# Patient Record
Sex: FEMALE | Race: WHITE | NOT HISPANIC OR LATINO | URBAN - METROPOLITAN AREA
[De-identification: names, ages, dates, MRNs, and addresses within clinical notes are randomized per-mention and may not be internally consistent; named-entity substitution may affect disease eponyms.]

---
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
## 2021-01-12 ENCOUNTER — E-VISIT (OUTPATIENT)
Dept: FAMILY MEDICINE CLINIC | Facility: CLINIC | Age: 21
End: 2021-01-12

## 2021-01-12 DIAGNOSIS — B37.49 GENITOURINARY INFECTION, CANDIDAL: Primary | ICD-10-CM

## 2021-01-12 PROCEDURE — 99213 OFFICE O/P EST LOW 20 MIN: CPT | Performed by: FAMILY MEDICINE

## 2021-01-12 RX ORDER — FLUCONAZOLE 100 MG/1
100 TABLET ORAL DAILY
Qty: 5 TABLET | Refills: 0 | Status: SHIPPED | OUTPATIENT
Start: 2021-01-12 | End: 2021-05-03 | Stop reason: SDUPTHER

## 2021-01-13 NOTE — PROGRESS NOTES
Chief Complaint  Vaginal discharge  Subjective          Mouna Manley presents to [facility] for vaginal discharge.  History of Present Illness  E-visit for vaginal discharge.  Symptoms noted few days ago with a thick whitish discharge.  She denies pelvic pain, fever, abdominal pain and the urinary symptoms.  Patient has spontaneous miscarriage 3 weeks ago was seen in the ER and followed by OB/GYN.  Objective   Vital Signs:   There were no vitals taken for this visit.    Physical Exam   E-Visit  Result Review :     CMP    CMP 10/13/20 12/16/20   BUN 11 7   Creatinine 0.56 (A) 0.52 (A)   eGFR Non African Am 139 >150   Sodium 138 137   Potassium 4.1 3.9   Chloride 104 104   Calcium 9.9 9.4   Albumin  4.60   Total Bilirubin  0.3   Alkaline Phosphatase  55   AST (SGOT)  13   ALT (SGPT)  11   (A) Abnormal value            CBC    CBC 10/13/20 12/16/20   WBC 5.86 6.10   RBC 4.39 3.93   Hemoglobin 12.5 11.3 (A)   Hematocrit 37.6 33.5 (A)   MCV 85.6 85.3   MCH 28.5 28.7   MCHC 33.2 33.6   RDW 13.3 13.8   Platelets 284 269   (A) Abnormal value            Data reviewed: Radiologic studies pelvic US          Assessment and Plan    Problem List Items Addressed This Visit        Infectious Diseases    Genitourinary infection, candidal - Primary    Relevant Medications    fluconazole (Diflucan) 100 MG tablet        Follow Up   Return in about 4 weeks (around 2/9/2021) for Recheck.  Patient was given instructions and counseling regarding her condition or for health maintenance advice. Please see specific information pulled into the AVS if appropriate.

## 2021-04-30 ENCOUNTER — E-VISIT (OUTPATIENT)
Dept: FAMILY MEDICINE CLINIC | Facility: CLINIC | Age: 21
End: 2021-04-30

## 2021-04-30 ENCOUNTER — TELEPHONE (OUTPATIENT)
Dept: FAMILY MEDICINE CLINIC | Facility: CLINIC | Age: 21
End: 2021-04-30

## 2021-04-30 DIAGNOSIS — N89.8 VAGINAL DISCHARGE: ICD-10-CM

## 2021-04-30 DIAGNOSIS — N30.00 ACUTE CYSTITIS WITHOUT HEMATURIA: Primary | ICD-10-CM

## 2021-04-30 PROCEDURE — 99422 OL DIG E/M SVC 11-20 MIN: CPT | Performed by: FAMILY MEDICINE

## 2021-04-30 NOTE — TELEPHONE ENCOUNTER
Caller: Mouna Manley    Relationship: Self    Best call back number: [phone]    Who are you requesting to speak with (clinical staff, provider,  specific staff member):CLINICAL STAFF    What was the call regarding: PATIENT WENT TO [facility] ER LAST NIGHT AND THEY RAN A BUNCH OF LAB WORK ON HER. THEY INFORMED PATIENT TODAY THAT THEY COULD NOT RELEASE HER LAB WORK TO HER OVER THE PHONE THAT HER PCP WOULD HAVE TO REQUEST THE RECORDS THEN GO OVER THE LABS WITH HER. CAN THE OFFICE GET THOSE LABS AND INFORM PATIENT OF RESULTS PLEASE.     Do you require a callback: YES          
Called [facility] they are to fax over records.  
artificial rupture

## 2021-05-03 PROBLEM — N30.00 ACUTE CYSTITIS WITHOUT HEMATURIA: Status: ACTIVE | Noted: 2021-05-03

## 2021-05-03 PROBLEM — N89.8 VAGINAL DISCHARGE: Status: ACTIVE | Noted: 2021-05-03

## 2021-05-03 RX ORDER — CIPROFLOXACIN 500 MG/1
500 TABLET, FILM COATED ORAL 2 TIMES DAILY
Qty: 14 TABLET | Refills: 0 | Status: SHIPPED | OUTPATIENT
Start: 2021-05-03 | End: 2021-05-10

## 2021-05-03 RX ORDER — FLUCONAZOLE 150 MG/1
150 TABLET ORAL ONCE
Qty: 1 TABLET | Refills: 0 | Status: SHIPPED | OUTPATIENT
Start: 2021-05-03 | End: 2021-05-03

## 2021-05-04 NOTE — PROGRESS NOTES
Chief Complaint  Follow up from [facility] ER on Vaginal discharge    Subjective    ROS  + vaginal discharge and pelvic pain,   no fever, dysuria, abdominal pain, nausea and vomiting.        Mouna Manley presents to [facility]  History of Present Illness  Patient seen at [facility] ER on 4/30/21 with vaginal discharge and back pain. Per patient she was told to have UTI but was not send home on any antibiotic.   Objective   Vital Signs:   There were no vitals taken for this visit.    Physical Exam   Result Review :     CBC    CBC 10/13/20 12/16/20   WBC 5.86 6.10   RBC 4.39 3.93   Hemoglobin 12.5 11.3 (A)   Hematocrit 37.6 33.5 (A)   MCV 85.6 85.3   MCH 28.5 28.7   MCHC 33.2 33.6   RDW 13.3 13.8   Platelets 284 269   (A) Abnormal value            UA    Urinalysis 12/16/20 12/16/20    1335 1335   Squamous Epithelial Cells, UA  0-2   Specific Phyllis, UA 1.011    Ketones, UA Negative    Blood, UA Large (3+) (A)    Leukocytes, UA Negative    Nitrite, UA Negative    RBC, UA  6-12 (A)   WBC, UA  0-2 (A)   Bacteria, UA  None Seen   (A) Abnormal value                      Assessment and Plan    Diagnoses and all orders for this visit:    1. Acute cystitis without hematuria (Primary)  Assessment & Plan:  Hospital records reviewed - chlamydia and  gonorrhea negative.  U/A - showed abnormalities, pelvic US showed ovarian cyst.  Rx Cipro and Diflucan take as directed.  F/U with OB/GYN.      2. Vaginal discharge    Other orders  -     fluconazole (Diflucan) 150 MG tablet; Take 1 tablet by mouth 1 (One) Time for 1 dose.  Dispense: 1 tablet; Refill: 0  -     ciprofloxacin (CIPRO) 500 MG tablet; Take 1 tablet by mouth 2 (Two) Times a Day for 7 days.  Dispense: 14 tablet; Refill: 0      Follow Up   Return in about 4 weeks (around 5/28/2021) for Recheck.  Patient was given instructions and counseling regarding her condition or for health maintenance advice. Please see specific information pulled into the AVS if  appropriate.

## 2021-05-04 NOTE — ASSESSMENT & PLAN NOTE
Hospital records reviewed - chlamydia and  gonorrhea negative.  U/A - showed abnormalities, pelvic US showed ovarian cyst.  Rx Cipro and Diflucan take as directed.  F/U with OB/GYN.

## 2021-07-25 ENCOUNTER — E-VISIT (OUTPATIENT)
Dept: FAMILY MEDICINE CLINIC | Facility: CLINIC | Age: 21
End: 2021-07-25

## 2021-07-25 DIAGNOSIS — N30.01 ACUTE CYSTITIS WITH HEMATURIA: Primary | ICD-10-CM

## 2021-07-25 PROCEDURE — 99212 OFFICE O/P EST SF 10 MIN: CPT | Performed by: FAMILY MEDICINE

## 2021-07-25 RX ORDER — PHENAZOPYRIDINE HYDROCHLORIDE 200 MG/1
200 TABLET, FILM COATED ORAL 3 TIMES DAILY
Qty: 6 TABLET | Refills: 0 | Status: SHIPPED | OUTPATIENT
Start: 2021-07-25 | End: 2021-07-27

## 2021-07-25 RX ORDER — CIPROFLOXACIN 500 MG/1
500 TABLET, FILM COATED ORAL 2 TIMES DAILY
Qty: 14 TABLET | Refills: 0 | Status: SHIPPED | OUTPATIENT
Start: 2021-07-25 | End: 2021-08-01

## 2021-07-25 NOTE — PROGRESS NOTES
Subjective   Mouna Manley is a 21 y.o. female.     History of Present Illness   E-visit for dysuria.  Patient complaining of pain with urination, frequent urination and the blood in urine.    The following portions of the patient's history were reviewed and updated as appropriate: past family history, past medical history, past social history, past surgical history and problem list.    Review of Systems  Positive for dysuria, urinary frequency, back pain and hematuria.  No fever, flank pain, nausea and vomiting    Objective   Physical Exam  E-visit.    Assessment/Plan   Problems Addressed this Visit        Genitourinary and Reproductive     Acute cystitis with hematuria - Primary     Encourage plenty of fluids Rx sent for Cipro and pyridium.  Please make appointment if symptoms does not get  better.         Relevant Medications    phenazopyridine (Pyridium) 200 MG tablet      Diagnoses       Codes Comments    Acute cystitis with hematuria    -  Primary ICD-10-CM: N30.01  ICD-9-CM: 595.0

## 2021-07-25 NOTE — ASSESSMENT & PLAN NOTE
Encourage plenty of fluids Rx sent for Cipro and pyridium.  Please make appointment if symptoms does not get  better.

## 2021-09-03 ENCOUNTER — E-VISIT (OUTPATIENT)
Dept: FAMILY MEDICINE CLINIC | Facility: CLINIC | Age: 21
End: 2021-09-03

## 2021-09-03 ENCOUNTER — E-VISIT (OUTPATIENT)
Dept: FAMILY MEDICINE CLINIC | Facility: TELEHEALTH | Age: 21
End: 2021-09-03

## 2021-09-03 DIAGNOSIS — R39.89 SUSPECTED UTI: Primary | ICD-10-CM

## 2021-09-03 DIAGNOSIS — N39.0 UTI (URINARY TRACT INFECTION), UNCOMPLICATED: Primary | ICD-10-CM

## 2021-09-03 PROCEDURE — 99213 OFFICE O/P EST LOW 20 MIN: CPT | Performed by: FAMILY MEDICINE

## 2021-09-03 PROCEDURE — 99422 OL DIG E/M SVC 11-20 MIN: CPT | Performed by: NURSE PRACTITIONER

## 2021-09-03 RX ORDER — PHENAZOPYRIDINE HYDROCHLORIDE 200 MG/1
200 TABLET, FILM COATED ORAL 3 TIMES DAILY
Qty: 6 TABLET | Refills: 0 | Status: SHIPPED | OUTPATIENT
Start: 2021-09-03 | End: 2021-09-05

## 2021-09-03 RX ORDER — PHENAZOPYRIDINE HYDROCHLORIDE 200 MG/1
200 TABLET, FILM COATED ORAL 3 TIMES DAILY PRN
Qty: 6 TABLET | Refills: 0 | Status: SHIPPED | OUTPATIENT
Start: 2021-09-03 | End: 2021-09-03 | Stop reason: SDUPTHER

## 2021-09-03 RX ORDER — NITROFURANTOIN 25; 75 MG/1; MG/1
100 CAPSULE ORAL 2 TIMES DAILY
Qty: 14 CAPSULE | Refills: 0 | Status: SHIPPED | OUTPATIENT
Start: 2021-09-03 | End: 2021-09-10

## 2021-09-03 RX ORDER — CIPROFLOXACIN 500 MG/1
500 TABLET, FILM COATED ORAL 2 TIMES DAILY
Qty: 14 TABLET | Refills: 0 | Status: SHIPPED | OUTPATIENT
Start: 2021-09-03 | End: 2021-09-10

## 2021-09-03 NOTE — PROGRESS NOTES
Mouna Manley    2000  [id number]    I have reviewed the e-Visit questionnaire and patient's answers, my assessment and plan are as follows:      [facility]  Mouna Manley is a 21 y.o. with a 1-4 day history of dysuria described as sharp pain, difficulty passing urine, cloudy yellow urine, odor to urine, back pain.  She denies fever/chills, nausea/vomiting, belly pain, hematuria, or vaginal symptoms.  She reports having these symptoms often which usually resolve with antibiotic treatment for UTI.     Review of Systems - Negative except symptoms listed in [facility]      Diagnoses and all orders for this visit:    1. Suspected UTI (Primary)  -     nitrofurantoin, macrocrystal-monohydrate, (MACROBID) 100 MG capsule; Take 1 capsule by mouth 2 (Two) Times a Day for 7 days.  Dispense: 14 capsule; Refill: 0  -     phenazopyridine (PYRIDIUM) 200 MG tablet; Take 1 tablet by mouth 3 (Three) Times a Day As Needed for Bladder Spasms for up to 2 days.  Dispense: 6 tablet; Refill: 0    -Macrobid as prescribed - complete entire course of medication even if you begin to feel better.   --Phenazopyridine is for painful urination and bladder spasms--this medication with cause urine to become bright orange and can stain undergarments.    -Continue to increase your fluid intake.   -Abstain from intercourse during antibiotic treatment.   -Practice good perineal hygiene: wipe front to back  -Do not hold your urine- go to the bathroom every 2-3 hours.     -Warning signs: severe abdominal/pelvic/back pain, fever >101, blood in urine - seek medical attention as soon as possible for a hands on/objective exam and possible labs.     -Follow up with your PCP in 2 days if no improvement in symptoms or if symptoms begin to worsen.       Any medications prescribed have been sent electronically to   [facility]/pharmacy #[id number] - [place], [place] - [address] - [phone]  - [phone]  [address] [place] [address]  Phone: [phone] Fax:  [phone]    [facility]/pharm[facility][id number] [place] [place] [place] [address] - [phone]  - [phone] FX  [address] [place] [address]  Phone: [phone] Fax: [phone]      Time Documentation  Counseled patient  Counseling topics: diagnosis, treatment options and return instructions  Total encounter time: counseling time more than 50% of visit: 20 minutes        ROSA ELENA Mcdaniel  09/03/21  07:50 EDT

## 2021-09-03 NOTE — PROGRESS NOTES
Subjective   Mouna Manley is a 21 y.o. female.     History of Present Illness     E-visit questionnaire reviewed present with complaint of urinary frequency and urgency. This is a new problem. The current episode started in the past 7 days. There has been no fever. Associated symptoms include frequency, hesitancy,  and urgency. Pertinent negatives include no discharge, flank pain, hematuria, nausea or vomiting.         The following portions of the patient's history were reviewed and updated as appropriate: past medical history, past social history, past surgical history and problem list.    Review of Systems  Positive for difficulty urinating, dysuria, burning, back pain but denies fever, abdominal pain, nausea and vomiting  Objective   Physical Exam  E-visit    Assessment/Plan   Problems Addressed this Visit        Genitourinary and Reproductive     UTI (urinary tract infection), uncomplicated - Primary     Rx Cipro and Pyridium take as directed.  Encourage fluids.         Relevant Medications    ciprofloxacin (CIPRO) 500 MG tablet      Diagnoses       Codes Comments    UTI (urinary tract infection), uncomplicated    -  Primary ICD-10-CM: N39.0  ICD-9-CM: 599.0

## 2021-09-03 NOTE — PATIENT INSTRUCTIONS
Urinary Tract Infection, Adult    A urinary tract infection (UTI) is an infection of any part of the urinary tract. The urinary tract includes the kidneys, ureters, bladder, and urethra. These organs make, store, and get rid of urine in the body.  Your health care provider may use other names to describe the infection. An upper UTI affects the ureters and kidneys (pyelonephritis). A lower UTI affects the bladder (cystitis) and urethra (urethritis).  What are the causes?  Most urinary tract infections are caused by bacteria in your genital area, around the entrance to your urinary tract (urethra). These bacteria grow and cause inflammation of your urinary tract.  What increases the risk?  You are more likely to develop this condition if:  · You have a urinary catheter that stays in place (indwelling).  · You are not able to control when you urinate or have a bowel movement (you have incontinence).  · You are female and you:  ? Use a spermicide or diaphragm for birth control.  ? Have low estrogen levels.  ? Are pregnant.  · You have certain genes that increase your risk (genetics).  · You are sexually active.  · You take antibiotic medicines.  · You have a condition that causes your flow of urine to slow down, such as:  ? An enlarged prostate, if you are male.  ? Blockage in your urethra (stricture).  ? A kidney stone.  ? A nerve condition that affects your bladder control (neurogenic bladder).  ? Not getting enough to drink, or not urinating often.  · You have certain medical conditions, such as:  ? Diabetes.  ? A weak disease-fighting system (immunesystem).  ? Sickle cell disease.  ? Gout.  ? Spinal cord injury.  What are the signs or symptoms?  Symptoms of this condition include:  · Needing to urinate right away (urgently).  · Frequent urination or passing small amounts of urine frequently.  · Pain or burning with urination.  · Blood in the urine.  · Urine that smells bad or unusual.  · Trouble urinating.  · Cloudy  urine.  · Vaginal discharge, if you are female.  · Pain in the abdomen or the lower back.  You may also have:  · Vomiting or a decreased appetite.  · Confusion.  · Irritability or tiredness.  · A fever.  · Diarrhea.  The first symptom in older adults may be confusion. In some cases, they may not have any symptoms until the infection has worsened.  How is this diagnosed?  This condition is diagnosed based on your medical history and a physical exam. You may also have other tests, including:  · Urine tests.  · Blood tests.  · Tests for sexually transmitted infections (STIs).  If you have had more than one UTI, a cystoscopy or imaging studies may be done to determine the cause of the infections.  How is this treated?  Treatment for this condition includes:  · Antibiotic medicine.  · Over-the-counter medicines to treat discomfort.  · Drinking enough water to stay hydrated.  If you have frequent infections or have other conditions such as a kidney stone, you may need to see a health care provider who specializes in the urinary tract (urologist).  In rare cases, urinary tract infections can cause sepsis. Sepsis is a life-threatening condition that occurs when the body responds to an infection. Sepsis is treated in the hospital with IV antibiotics, fluids, and other medicines.  Follow these instructions at home:    Medicines  · Take over-the-counter and prescription medicines only as told by your health care provider.  · If you were prescribed an antibiotic medicine, take it as told by your health care provider. Do not stop using the antibiotic even if you start to feel better.  General instructions  · Make sure you:  ? Empty your bladder often and completely. Do not hold urine for long periods of time.  ? Empty your bladder after sex.  ? Wipe from front to back after a bowel movement if you are female. Use each tissue one time when you wipe.  · Drink enough fluid to keep your urine pale yellow.  · Keep all follow-up  visits as told by your health care provider. This is important.  Contact a health care provider if:  · Your symptoms do not get better after 1-2 days.  · Your symptoms go away and then return.  Get help right away if you have:  · Severe pain in your back or your lower abdomen.  · A fever.  · Nausea or vomiting.  Summary  · A urinary tract infection (UTI) is an infection of any part of the urinary tract, which includes the kidneys, ureters, bladder, and urethra.  · Most urinary tract infections are caused by bacteria in your genital area, around the entrance to your urinary tract (urethra).  · Treatment for this condition often includes antibiotic medicines.  · If you were prescribed an antibiotic medicine, take it as told by your health care provider. Do not stop using the antibiotic even if you start to feel better.  · Keep all follow-up visits as told by your health care provider. This is important.  This information is not intended to replace advice given to you by your health care provider. Make sure you discuss any questions you have with your health care provider.  Document Revised: 12/05/2019 Document Reviewed: 06/27/2019  "[employer] Patient Education © 2021 "[employer] Inc.

## 2021-10-15 ENCOUNTER — TELEMEDICINE (OUTPATIENT)
Dept: FAMILY MEDICINE CLINIC | Facility: CLINIC | Age: 21
End: 2021-10-15

## 2021-10-15 DIAGNOSIS — R23.3 EASY BRUISING: Primary | ICD-10-CM

## 2021-10-15 DIAGNOSIS — R53.83 FATIGUE, UNSPECIFIED TYPE: ICD-10-CM

## 2021-10-15 DIAGNOSIS — J06.9 UPPER RESPIRATORY TRACT INFECTION DUE TO COVID-19 VIRUS: ICD-10-CM

## 2021-10-15 DIAGNOSIS — U07.1 UPPER RESPIRATORY TRACT INFECTION DUE TO COVID-19 VIRUS: ICD-10-CM

## 2021-10-15 PROCEDURE — 99213 OFFICE O/P EST LOW 20 MIN: CPT | Performed by: FAMILY MEDICINE

## 2021-10-15 NOTE — PROGRESS NOTES
Subjective   Mouna Manley is a 21 y.o. female.     You have chosen to receive care through a telehealth visit.  Do you consent to use a video/audio connection for your medical care today? Yes.    Telehealth visit in view of COVID-19 pandemic. She present with C/O fatigue and easy bruising.  She also C/O cough and congestion and  tested positive for COVID-19.  URI   This is a new problem. The current episode started in the past 7 days. The problem has been gradually improving. There has been no fever. Associated symptoms include congestion and coughing. Pertinent negatives include no abdominal pain, ear pain, sinus pain, sore throat or wheezing. She has tried increased fluids for the symptoms.        The following portions of the patient's history were reviewed and updated as appropriate: past medical history, past social history, past surgical history and problem list.    Review of Systems   Constitutional: Negative for fever.   HENT: Positive for congestion. Negative for ear pain, sinus pressure and sore throat.    Respiratory: Positive for cough. Negative for shortness of breath and wheezing.    Gastrointestinal: Negative for abdominal pain.   Hematological: Negative for adenopathy. Bruises/bleeds easily.       Objective   Physical Exam  Pulmonary:      Effort: Pulmonary effort is normal.   Neurological:      Mental Status: She is alert.       Current Outpatient Medications on File Prior to Visit   Medication Sig Dispense Refill   • dicyclomine (BENTYL) 20 MG tablet TAKE 1 TABLET BY MOUTH TWICE A DAY 60 tablet 1     No current facility-administered medications on file prior to visit.           Assessment/Plan   Problems Addressed this Visit        Skin    Easy bruising - Primary    Relevant Orders    CBC w AUTO Differential    Basic metabolic panel       Symptoms and Signs    Fatigue    Relevant Orders    CBC w AUTO Differential    Basic metabolic panel       Other    Upper respiratory tract infection due to  COVID-19 virus     Discussed Sx management, fluids and rest.    This is a video visit [employer]. Was  used to complete this visit. Total time of discussion was 20 minutes.           Diagnoses       Codes Comments    Easy bruising    -  Primary ICD-10-CM: R23.8  ICD-9-CM: 782.9     Fatigue, unspecified type     ICD-10-CM: R53.83  ICD-9-CM: 780.79     Upper respiratory tract infection due to COVID-19 virus     ICD-10-CM: U07.1, J06.9  ICD-9-CM: 465.9, 079.89

## 2021-10-24 NOTE — ASSESSMENT & PLAN NOTE
Discussed Sx management, fluids and rest.    This is a video visit [employer] danielle. Was  used to complete this visit. Total time of discussion was 20 minutes.

## 2021-11-30 ENCOUNTER — HOSPITAL ENCOUNTER (EMERGENCY)
Facility: HOSPITAL | Age: 21
Discharge: HOME | End: 2021-11-30
Admitting: EMERGENCY MEDICINE

## 2021-11-30 ENCOUNTER — APPOINTMENT (OUTPATIENT)
Dept: ULTRASOUND IMAGING | Facility: HOSPITAL | Age: 21
End: 2021-11-30

## 2021-11-30 VITALS
SYSTOLIC BLOOD PRESSURE: 107 MMHG | BODY MASS INDEX: 19.13 KG/M2 | WEIGHT: 121.91 LBS | TEMPERATURE: 98.7 F | HEART RATE: 89 BPM | HEIGHT: 67 IN | RESPIRATION RATE: 16 BRPM | DIASTOLIC BLOOD PRESSURE: 63 MMHG | OXYGEN SATURATION: 97 %

## 2021-11-30 DIAGNOSIS — Z34.90 INTRAUTERINE PREGNANCY: ICD-10-CM

## 2021-11-30 DIAGNOSIS — B37.31 VAGINAL CANDIDIASIS: Primary | ICD-10-CM

## 2021-11-30 DIAGNOSIS — R10.30 LOWER ABDOMINAL PAIN: ICD-10-CM

## 2021-11-30 LAB
BILIRUB UR QL STRIP: NEGATIVE
CLARITY UR: CLEAR
CLUE CELLS SPEC QL WET PREP: ABNORMAL
COLOR UR: YELLOW
GLUCOSE UR STRIP-MCNC: NEGATIVE MG/DL
HGB UR QL STRIP.AUTO: NEGATIVE
HYDATID CYST SPEC WET PREP: ABNORMAL
KETONES UR QL STRIP: NEGATIVE
LEUKOCYTE ESTERASE UR QL STRIP.AUTO: NEGATIVE
NITRITE UR QL STRIP: NEGATIVE
PH UR STRIP.AUTO: 5.5 [PH] (ref 5–8)
PROT UR QL STRIP: NEGATIVE
SP GR UR STRIP: 1.02 (ref 1–1.03)
T VAGINALIS SPEC QL WET PREP: ABNORMAL
UROBILINOGEN UR QL STRIP: NORMAL
WBC SPEC QL WET PREP: ABNORMAL
YEAST GENITAL QL WET PREP: ABNORMAL

## 2021-11-30 PROCEDURE — 99283 EMERGENCY DEPT VISIT LOW MDM: CPT

## 2021-11-30 PROCEDURE — 87086 URINE CULTURE/COLONY COUNT: CPT

## 2021-11-30 PROCEDURE — 81003 URINALYSIS AUTO W/O SCOPE: CPT

## 2021-11-30 PROCEDURE — 87210 SMEAR WET MOUNT SALINE/INK: CPT

## 2021-11-30 PROCEDURE — 76817 TRANSVAGINAL US OBSTETRIC: CPT

## 2021-11-30 PROCEDURE — 76801 OB US < 14 WKS SINGLE FETUS: CPT

## 2021-11-30 PROCEDURE — 93976 VASCULAR STUDY: CPT

## 2021-12-03 LAB — BACTERIA SPEC AEROBE CULT: NORMAL

## 2022-02-06 ENCOUNTER — APPOINTMENT (OUTPATIENT)
Dept: GENERAL RADIOLOGY | Facility: HOSPITAL | Age: 22
End: 2022-02-06

## 2022-02-06 ENCOUNTER — HOSPITAL ENCOUNTER (EMERGENCY)
Facility: HOSPITAL | Age: 22
Discharge: ANOTHER HOSPITAL | End: 2022-02-07
Attending: EMERGENCY MEDICINE | Admitting: EMERGENCY MEDICINE

## 2022-02-06 ENCOUNTER — APPOINTMENT (OUTPATIENT)
Dept: ULTRASOUND IMAGING | Facility: HOSPITAL | Age: 22
End: 2022-02-06

## 2022-02-06 DIAGNOSIS — R45.851 SUICIDAL IDEATION: ICD-10-CM
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
## 2022-02-06 DIAGNOSIS — Z3A.15 15 WEEKS GESTATION OF PREGNANCY: ICD-10-CM

## 2022-02-06 DIAGNOSIS — S71.112A LACERATION OF LEFT THIGH, INITIAL ENCOUNTER: Primary | ICD-10-CM

## 2022-02-06 LAB
AMPHET+METHAMPHET UR QL: NEGATIVE
BACTERIA UR QL AUTO: ABNORMAL /HPF
BARBITURATES UR QL SCN: NEGATIVE
BENZODIAZ UR QL SCN: NEGATIVE
BILIRUB UR QL STRIP: NEGATIVE
CANNABINOIDS SERPL QL: POSITIVE
CLARITY UR: CLEAR
COCAINE UR QL: NEGATIVE
COLOR UR: YELLOW
GLUCOSE UR STRIP-MCNC: NEGATIVE MG/DL
HGB UR QL STRIP.AUTO: NEGATIVE
HYALINE CASTS UR QL AUTO: ABNORMAL /LPF
KETONES UR QL STRIP: ABNORMAL
LEUKOCYTE ESTERASE UR QL STRIP.AUTO: ABNORMAL
METHADONE UR QL SCN: NEGATIVE
NITRITE UR QL STRIP: NEGATIVE
OPIATES UR QL: NEGATIVE
OXYCODONE UR QL SCN: NEGATIVE
PH UR STRIP.AUTO: 7.5 [PH] (ref 5–8)
PROT UR QL STRIP: NEGATIVE
RBC # UR STRIP: ABNORMAL /HPF
REF LAB TEST METHOD: ABNORMAL
SARS-COV-2 RNA PNL SPEC NAA+PROBE: NOT DETECTED
SP GR UR STRIP: 1.03 (ref 1–1.03)
SQUAMOUS #/AREA URNS HPF: ABNORMAL /HPF
UROBILINOGEN UR QL STRIP: ABNORMAL
WBC # UR STRIP: ABNORMAL /HPF

## 2022-02-06 PROCEDURE — 99284 EMERGENCY DEPT VISIT MOD MDM: CPT

## 2022-02-06 PROCEDURE — 80307 DRUG TEST PRSMV CHEM ANLYZR: CPT | Performed by: EMERGENCY MEDICINE

## 2022-02-06 PROCEDURE — 87635 SARS-COV-2 COVID-19 AMP PRB: CPT | Performed by: EMERGENCY MEDICINE

## 2022-02-06 PROCEDURE — 76805 OB US >/= 14 WKS SNGL FETUS: CPT

## 2022-02-06 PROCEDURE — 81001 URINALYSIS AUTO W/SCOPE: CPT | Performed by: EMERGENCY MEDICINE

## 2022-02-06 PROCEDURE — 73030 X-RAY EXAM OF SHOULDER: CPT

## 2022-02-07 VITALS
SYSTOLIC BLOOD PRESSURE: 107 MMHG | HEIGHT: 67 IN | DIASTOLIC BLOOD PRESSURE: 68 MMHG | WEIGHT: 128.2 LBS | BODY MASS INDEX: 20.12 KG/M2 | OXYGEN SATURATION: 100 % | TEMPERATURE: 97.7 F | RESPIRATION RATE: 20 BRPM | HEART RATE: 73 BPM

## 2022-02-07 RX ORDER — CEFDINIR 300 MG/1
300 CAPSULE ORAL 2 TIMES DAILY
Qty: 10 CAPSULE | Refills: 0 | Status: SHIPPED | OUTPATIENT
Start: 2022-02-07 | End: 2022-12-09

## 2022-02-07 RX ORDER — CEFDINIR 300 MG/1
300 CAPSULE ORAL ONCE
Status: COMPLETED | OUTPATIENT
Start: 2022-02-07 | End: 2022-02-07

## 2022-02-07 RX ADMIN — CEFDINIR 300 MG: 300 CAPSULE ORAL at 01:36

## 2022-02-07 NOTE — ED PROVIDER NOTES
Subjective   Chief complaint: Patient is a pleasant 21-year-old female.  Today she began cutting her thighs she has some family in social issues that have been causing her to have thoughts of suicide.  She states she does not feel safe going home today.  She states she feels that she may harm herself although she does not have a definitive plan.  She has had depression in the past.  She is currently 15 weeks pregnant.  She states she has had 2 prior pregnancies that all resulted in miscarriage.  She states she fell down the stairs going outside today.  She has pain in her shoulder and pain in her low back.  No pain in her abdomen.  No loss of fluid or vaginal bleeding.  He has no numbness or weakness.  No head injury.    Context: As above    Duration: 4 PM she began cutting herself.    Timing: She has had suicidal thoughts all day    Severity: Her symptoms are becoming significant more severe    Associated Symptoms: Negative steps no above.  Appropriate PPE was used.        PCP:  LMP: Pregnant          Review of Systems   Constitutional: Negative for fever.   HENT: Negative.    Respiratory: Negative.    Cardiovascular: Negative.    Gastrointestinal: Negative for abdominal pain.   Genitourinary: Negative.    Musculoskeletal: Positive for arthralgias and back pain.   Skin: Negative.    Neurological: Negative.    Psychiatric/Behavioral: Negative.        Past Medical History:   Diagnosis Date   • Asthma    • Depression    • Eating disorder    • Headache    • Miscarriage 2019       No Known Allergies    Past Surgical History:   Procedure Laterality Date   • DENTAL PROCEDURE         Family History   Problem Relation Age of Onset   • Diabetes Maternal Grandfather        Social History     Socioeconomic History   • Marital status: Legally    Tobacco Use   • Smoking status: Former Smoker     Types: Electronic Cigarette     Quit date: 2019     Years since quittin.6   • Smokeless tobacco: Never Used   •  Tobacco comment:  vaped in the past but no longer   Substance and Sexual Activity   • Alcohol use: No   • Drug use: No   • Sexual activity: Yes     Partners: Male     Birth control/protection: None           Objective   Physical Exam  Vitals and nursing note reviewed. Exam conducted with a chaperone present.   Constitutional:       Appearance: Normal appearance.   HENT:      Head: Normocephalic and atraumatic.   Eyes:      Extraocular Movements: Extraocular movements intact.      Pupils: Pupils are equal, round, and reactive to light.   Cardiovascular:      Rate and Rhythm: Normal rate and regular rhythm.      Pulses: Normal pulses.      Heart sounds: Normal heart sounds.   Pulmonary:      Effort: Pulmonary effort is normal.      Breath sounds: Normal breath sounds.   Abdominal:      Tenderness: There is no abdominal tenderness.   Musculoskeletal:        Arms:       Cervical back: Neck supple. No tenderness.        Legs:       Comments: Tenderness to palpate to sacrum.  No step-offs no deformities.    Right shoulder has full range of motion.  There is some tenderness with abduction.  Neurovascular intact distally.  Axillary sensation intact.   Skin:     Capillary Refill: Capillary refill takes less than 2 seconds.             Comments: Multiple superficial lacerations to both thighs.  Significant area of lacerations.  One is deep enough at 2.5 cm for laceration repair.   Neurological:      General: No focal deficit present.      Mental Status: She is alert and oriented to person, place, and time.   Psychiatric:         Attention and Perception: Attention normal.         Mood and Affect: Affect is flat.         Behavior: Behavior normal.         Thought Content: Thought content includes suicidal ideation.         Cognition and Memory: Cognition normal.         Laceration Repair    Date/Time: 2/7/2022 12:39 AM  Performed by: Varun Castro DO  Authorized by: Varun Castro DO     Consent:     Consent  obtained:  Verbal    Consent given by:  Patient    Risks discussed:  Infection, pain, poor cosmetic result, need for additional repair, tendon damage, retained foreign body, poor wound healing, nerve damage and vascular damage    Alternatives discussed:  No treatment and delayed treatment  Anesthesia (see MAR for exact dosages):     Anesthesia method:  Local infiltration    Local anesthetic:  Lidocaine 1% WITH epi  Laceration details:     Location:  Leg    Leg location:  L upper leg    Length (cm):  2.5  Repair type:     Repair type:  Simple  Pre-procedure details:     Preparation:  Patient was prepped and draped in usual sterile fashion  Exploration:     Hemostasis achieved with:  Direct pressure    Wound exploration: wound explored through full range of motion and entire depth of wound probed and visualized      Wound extent: areolar tissue violated      Contaminated: no    Treatment:     Area cleansed with:  Hibiclens    Amount of cleaning:  Standard    Irrigation solution:  Sterile saline    Irrigation method:  Pressure wash  Skin repair:     Repair method:  Sutures    Suture size:  5-0    Suture material:  Nylon    Suture technique:  Simple interrupted    Number of sutures:  3  Approximation:     Approximation:  Close  Post-procedure details:     Dressing:  Antibiotic ointment               ED Course      Results for orders placed or performed during the hospital encounter of 02/06/22   COVID-19,CEPHEID/SHANE,COR/ERMIAS/PAD/ARNAUD IN-HOUSE(OR EMERGENT/ADD-ON),NP SWAB IN TRANSPORT MEDIA 3-4 HR TAT, RT-PCR - Swab, Nasopharynx    Specimen: Nasopharynx; Swab   Result Value Ref Range    COVID19 Not Detected Not Detected - Ref. Range   Urine Drug Screen - Urine, Clean Catch    Specimen: Urine, Clean Catch   Result Value Ref Range    Amphet/Methamphet, Screen Negative Negative    Barbiturates Screen, Urine Negative Negative    Benzodiazepine Screen, Urine Negative Negative    Cocaine Screen, Urine Negative Negative    Opiate  Screen Negative Negative    THC, Screen, Urine Positive (A) Negative    Methadone Screen, Urine Negative Negative    Oxycodone Screen, Urine Negative Negative   Urinalysis With Microscopic If Indicated (No Culture) - Urine, Clean Catch    Specimen: Urine, Clean Catch   Result Value Ref Range    Color, UA Yellow Yellow, Straw    Appearance, UA Clear Clear    pH, UA 7.5 5.0 - 8.0    Specific Gravity, UA 1.026 1.005 - 1.030    Glucose, UA Negative Negative    Ketones, UA Trace (A) Negative    Bilirubin, UA Negative Negative    Blood, UA Negative Negative    Protein, UA Negative Negative    Leuk Esterase, UA Trace (A) Negative    Nitrite, UA Negative Negative    Urobilinogen, UA 1.0 E.U./dL 0.2 - 1.0 E.U./dL   Urinalysis, Microscopic Only - Urine, Clean Catch    Specimen: Urine, Clean Catch   Result Value Ref Range    RBC, UA 0-2 (A) None Seen /HPF    WBC, UA 6-12 (A) None Seen /HPF    Bacteria, UA Trace (A) None Seen /HPF    Squamous Epithelial Cells, UA 3-6 (A) None Seen, 0-2 /HPF    Hyaline Casts, UA None Seen None Seen /LPF    Methodology Automated Microscopy            XR Shoulder 2+ View Right    Result Date: 2/6/2022  No fracture. Electronically signed by:  Walter Desai M.D.  2/6/2022 7:43 PM                                            MDM  Number of Diagnoses or Management Options  15 weeks gestation of pregnancy  Fall, initial encounter  Laceration of left thigh, initial encounter  Suicidal ideation  Diagnosis management comments: Patient has good range of motion of her shoulders neurovascular intact distally.  She declined x-ray of her sacral area.  She had no lumbar pain.  She is neuro logically intact.  Ambulating here fine in the emergency department.  Patient did present with multiple lacerations very superficial to her bilateral thighs.  One did require some suturing.  This was done per procedure note.  Patient tolerated the procedure well.  However she did state that she did not feel safe with her self  as she is already had multiple lacerations the concern was that potentially she may harm herself further if I discharged her home.  She verbalized that she felt like she needed inpatient admission she did not feel safe on her own.  Secondary to this she was placed on a hold.  [facility] was called and did agree to admit the patient to  who accepted the patient.  Patient with no abdominal pain on exam.  On ultrasound she did fall and had low back pain.  Ultrasound shows no abnormalities.  IUP with good fetal heart tones.  Chaperone present for laceration repair.       Amount and/or Complexity of Data Reviewed  Clinical lab tests: reviewed  Tests in the radiology section of CPT®: reviewed  Discussion of test results with the performing providers: yes  Discuss the patient with other providers: yes  Independent visualization of images, tracings, or specimens: yes    Risk of Complications, Morbidity, and/or Mortality  Presenting problems: high  Management options: high    Patient Progress  Patient progress: other (comment)      Final diagnoses:   None   Suicidal ideation  2 and half centimeter leg laceration status post repair   fall  Right shoulder sprain  UTI  ED Disposition  ED Disposition     None          No follow-up provider specified.       Medication List      No changes were made to your prescriptions during this visit.          Varun Castro, DO  02/07/22 0045       Varun Castro, DO  02/07/22 0051       Varun Castro, DO  02/07/22 0100
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
## 2022-11-06 NOTE — ED PROVIDER NOTES
Subjective   Chief Complaint: Pelvic pain  Context: Patient reports a 4-day history of pelvic pain.  She reports that she was seen at the urgent care earlier and diagnosed with a urinary tract infection and a positive urine pregnancy test.  She reports they told her she would need a pelvic ultrasound.  She reports that she was at her OB office about a week ago and had a negative urine pregnancy test.  Her last menses was June 19.  No bleeding or discharge.  She reports that she has had urinary symptoms for about 4 days.  Duration: 4 days  Timing: Constant  Severity: Mild  Associated symptoms and or modifying factors: As above.  G1, P0          History provided by:  Patient      Review of Systems   Constitutional: Negative for chills and fever.   HENT: Negative for congestion and sore throat.    Eyes: Negative for redness.   Respiratory: Negative for cough and shortness of breath.    Cardiovascular: Negative for chest pain.   Gastrointestinal: Negative for abdominal pain, diarrhea, nausea and vomiting.   Genitourinary: Positive for pelvic pain. Negative for dysuria.   Musculoskeletal: Negative for back pain.   Skin: Negative for rash.   Neurological: Negative for headaches.   All other systems reviewed and are negative.      Past Medical History:   Diagnosis Date   • Asthma        No Known Allergies    Past Surgical History:   Procedure Laterality Date   • DENTAL PROCEDURE         History reviewed. No pertinent family history.    Social History     Socioeconomic History   • Marital status:      Spouse name: Not on file   • Number of children: Not on file   • Years of education: Not on file   • Highest education level: Not on file   Tobacco Use   • Smoking status: Current Some Day Smoker   • Tobacco comment: vape   Substance and Sexual Activity   • Alcohol use: No     Frequency: Never           Objective   Physical Exam   The patient is well-developed, well-nourished, alert, cooperative and in no acute  "distress.    HEENT exam is normocephalic and atraumatic. Mucous membranes are moist.     Abdomen is soft nondistended. No guarding or rebound noted.  Mildly tender suprapubic  Back shows no CVA tenderness.     Extremities: There is no significant deformity or joint abnormality. No edema.     Neurologic: Oriented x3 without focal neurological deficits.    Skin shows no rash, petechiae, or purpura.    Procedures           ED Course  ED Course as of Aug 03 1903   Sat Aug 03, 2019   1819 A Positive blood type  [AC]      ED Course User Index  [AC] Jessica Méndez APRN      Labs Reviewed   HCG, QUANTITATIVE, PREGNANCY    Narrative:     HCG Gestational age values:                   0.2-1 week       5-50 MIU/mL          1-2 weeks        MIU/mL       2-3 weeks      100-5000 MIU/mL       3-4 weeks      500-87817 MIU/mL       4-5 weeks     1000-86649 MIU/mL       5-6 weeks    [id number] MIU/mL       6-8 weeks    [id number] MIU/mL    Non-pregnant:           <5 mIU/mL    Males (Tumor Marker):   <5 mIU/mL   ABO/RH     Us Ob Transvaginal    Result Date: 8/3/2019  1.Uterus appears within normal limits. No intrauterine gestation identified. 2.No abnormal mass lesion identified within the adnexal region. 3.Recommend correlation with serial hCG's and repeat pelvic ultrasound as clinically warranted. 4.1.3 cm left adnexal cyst, which may be physiologic or functional.  Electronically Signed By-DR. Camden Aquino MD On:8/3/2019 5:34 PM This report was finalized on [id number] by DR. Camden Aquino MD.    Medications - No data to display  /73   Pulse 94   Temp 98.1 °F (36.7 °C)   Resp 16   Ht 168.9 cm (66.5\")   Wt 63.5 kg (140 lb)   LMP 06/19/2019 (Exact Date)   SpO2 96%   BMI 22.26 kg/m²           MDM  Number of Diagnoses or Management Options  Less than 8 weeks gestation of pregnancy:   Pelvic pain:   Diagnosis management comments: MEDICAL DECISION  Comorbidities: Pregnant   differentials: Ectopic, " threatened AB, UTI; this list is not all inclusive and does not constitute the entirety of considered causes  Radiology interpretation:  Images reviewed by me and interpreted by radiologist, ultrasound within normal limits, no intrauterine gestation identified.  No abnormal mass or lesion identified within the adnexal region.  Lab interpretation:  Labs viewed by me significant for, urinalysis showed leukocytes at the urgent care, it was sent for culture.  Patient's blood type is a positive.  Her hCG is 208 today.    Results and plan for discharge were discussed.         Amount and/or Complexity of Data Reviewed  Clinical lab tests: reviewed and ordered  Tests in the radiology section of CPT®: reviewed and ordered          Final diagnoses:   Less than 8 weeks gestation of pregnancy   Pelvic pain            Jessica Méndez, APRN  08/03/19 3472     15

## 2022-12-09 ENCOUNTER — OFFICE VISIT (OUTPATIENT)
Dept: FAMILY MEDICINE CLINIC | Facility: CLINIC | Age: 22
End: 2022-12-09

## 2022-12-09 VITALS
HEIGHT: 67 IN | DIASTOLIC BLOOD PRESSURE: 68 MMHG | WEIGHT: 116.6 LBS | BODY MASS INDEX: 18.3 KG/M2 | TEMPERATURE: 98.2 F | OXYGEN SATURATION: 100 % | HEART RATE: 64 BPM | SYSTOLIC BLOOD PRESSURE: 104 MMHG

## 2022-12-09 DIAGNOSIS — R63.4 WEIGHT LOSS, ABNORMAL: ICD-10-CM

## 2022-12-09 DIAGNOSIS — F32.1 CURRENT MODERATE EPISODE OF MAJOR DEPRESSIVE DISORDER, UNSPECIFIED WHETHER RECURRENT: ICD-10-CM

## 2022-12-09 DIAGNOSIS — L65.9 HAIR LOSS: ICD-10-CM

## 2022-12-09 DIAGNOSIS — R53.83 FATIGUE, UNSPECIFIED TYPE: ICD-10-CM

## 2022-12-09 DIAGNOSIS — R23.3 EASY BRUISING: Primary | ICD-10-CM

## 2022-12-09 DIAGNOSIS — F41.9 ANXIETY: ICD-10-CM

## 2022-12-09 DIAGNOSIS — Z32.00 POSSIBLE PREGNANCY, NOT CONFIRMED: ICD-10-CM

## 2022-12-09 PROCEDURE — 99214 OFFICE O/P EST MOD 30 MIN: CPT | Performed by: NURSE PRACTITIONER

## 2022-12-09 RX ORDER — FLUOXETINE HYDROCHLORIDE 20 MG/1
20 CAPSULE ORAL DAILY
Qty: 30 CAPSULE | Refills: 2 | Status: SHIPPED | OUTPATIENT
Start: 2022-12-09
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
## 2022-12-10 LAB
25(OH)D3+25(OH)D2 SERPL-MCNC: 16.4 NG/ML (ref 30–100)
ALBUMIN SERPL-MCNC: 4.9 G/DL (ref 3.9–5)
ALBUMIN/GLOB SERPL: 2 {RATIO} (ref 1.2–2.2)
ALP SERPL-CCNC: 66 IU/L (ref 44–121)
ALT SERPL-CCNC: 13 IU/L (ref 0–32)
AST SERPL-CCNC: 14 IU/L (ref 0–40)
BASOPHILS # BLD AUTO: 0 X10E3/UL (ref 0–0.2)
BASOPHILS NFR BLD AUTO: 0 %
BILIRUB SERPL-MCNC: 0.3 MG/DL (ref 0–1.2)
BUN SERPL-MCNC: 9 MG/DL (ref 6–20)
BUN/CREAT SERPL: 16 (ref 9–23)
CALCIUM SERPL-MCNC: 9.9 MG/DL (ref 8.7–10.2)
CHLORIDE SERPL-SCNC: 99 MMOL/L (ref 96–106)
CO2 SERPL-SCNC: 23 MMOL/L (ref 20–29)
CREAT SERPL-MCNC: 0.58 MG/DL (ref 0.57–1)
EGFRCR SERPLBLD CKD-EPI 2021: 132 ML/MIN/1.73
EOSINOPHIL # BLD AUTO: 0.1 X10E3/UL (ref 0–0.4)
EOSINOPHIL NFR BLD AUTO: 1 %
ERYTHROCYTE [DISTWIDTH] IN BLOOD BY AUTOMATED COUNT: 13.5 % (ref 11.7–15.4)
GLOBULIN SER CALC-MCNC: 2.5 G/DL (ref 1.5–4.5)
GLUCOSE SERPL-MCNC: 83 MG/DL (ref 70–99)
HCG INTACT+B SERPL-ACNC: <1 MIU/ML
HCT VFR BLD AUTO: 38.8 % (ref 34–46.6)
HGB BLD-MCNC: 12.9 G/DL (ref 11.1–15.9)
IMM GRANULOCYTES # BLD AUTO: 0 X10E3/UL (ref 0–0.1)
IMM GRANULOCYTES NFR BLD AUTO: 0 %
LYMPHOCYTES # BLD AUTO: 2.3 X10E3/UL (ref 0.7–3.1)
LYMPHOCYTES NFR BLD AUTO: 42 %
MCH RBC QN AUTO: 29 PG (ref 26.6–33)
MCHC RBC AUTO-ENTMCNC: 33.2 G/DL (ref 31.5–35.7)
MCV RBC AUTO: 87 FL (ref 79–97)
MONOCYTES # BLD AUTO: 0.3 X10E3/UL (ref 0.1–0.9)
MONOCYTES NFR BLD AUTO: 6 %
NEUTROPHILS # BLD AUTO: 2.7 X10E3/UL (ref 1.4–7)
NEUTROPHILS NFR BLD AUTO: 51 %
PLATELET # BLD AUTO: 294 X10E3/UL (ref 150–450)
POTASSIUM SERPL-SCNC: 4.5 MMOL/L (ref 3.5–5.2)
PROT SERPL-MCNC: 7.4 G/DL (ref 6–8.5)
RBC # BLD AUTO: 4.45 X10E6/UL (ref 3.77–5.28)
SODIUM SERPL-SCNC: 139 MMOL/L (ref 134–144)
TSH SERPL DL<=0.005 MIU/L-ACNC: 0.96 UIU/ML (ref 0.45–4.5)
VIT B12 SERPL-MCNC: 779 PG/ML (ref 232–1245)
WBC # BLD AUTO: 5.4 X10E3/UL (ref 3.4–10.8)

## 2022-12-12 RX ORDER — ERGOCALCIFEROL 1.25 MG/1
50000 CAPSULE ORAL WEEKLY
Qty: 5 CAPSULE | Refills: 5 | Status: SHIPPED | OUTPATIENT
Start: 2022-12-12 | End: 2022-12-14 | Stop reason: SDUPTHER

## 2022-12-12 NOTE — PROGRESS NOTES
Please call patient with results of labs.  Labs are all stable.  Please let patient know that pregnancy test is negative.  Vitamin D is very low.  I am sending in supplement for once weekly.  Keep follow-up as discussed in office.

## 2022-12-14 RX ORDER — ERGOCALCIFEROL 1.25 MG/1
50000 CAPSULE ORAL WEEKLY
Qty: 5 CAPSULE | Refills: 5 | Status: SHIPPED | OUTPATIENT
Start: 2022-12-14

## 2023-01-06 ENCOUNTER — PATIENT ROUNDING (BHMG ONLY) (OUTPATIENT)
Dept: FAMILY MEDICINE CLINIC | Facility: CLINIC | Age: 23
End: 2023-01-06
Payer: MEDICAID

## 2023-01-06 NOTE — PROGRESS NOTES
Sent Patient following in [employer] Message:  My name is Tommy Fajardo      I am the Practice Manager with   [facility] [address]  [facility] [address] [phone].      I am messaging to officially welcome you to our practice and ask about your recent visit.     Tell me about your visit with us. What things went well?         We're always looking for ways to make our patients' experiences even better. Do you have recommendations on ways we may improve?       Overall were you satisfied with your first visit to our practice?        Is there anything else I can do for you?       Thank you, and have a great day.

## 2023-01-30 ENCOUNTER — TELEPHONE (OUTPATIENT)
Dept: FAMILY MEDICINE CLINIC | Facility: CLINIC | Age: 23
End: 2023-01-30

## 2023-01-30 ENCOUNTER — OFFICE VISIT (OUTPATIENT)
Dept: FAMILY MEDICINE CLINIC | Facility: CLINIC | Age: 23
End: 2023-01-30
Payer: MEDICAID

## 2023-01-30 VITALS
TEMPERATURE: 98 F | HEIGHT: 67 IN | HEART RATE: 101 BPM | WEIGHT: 121.2 LBS | BODY MASS INDEX: 19.02 KG/M2 | SYSTOLIC BLOOD PRESSURE: 100 MMHG | DIASTOLIC BLOOD PRESSURE: 62 MMHG | OXYGEN SATURATION: 98 %

## 2023-01-30 DIAGNOSIS — R30.0 DYSURIA: Primary | ICD-10-CM

## 2023-01-30 DIAGNOSIS — N39.0 UTI (URINARY TRACT INFECTION), UNCOMPLICATED: ICD-10-CM

## 2023-01-30 LAB
BILIRUB BLD-MCNC: NEGATIVE MG/DL
EXPIRATION DATE: ABNORMAL
GLUCOSE UR STRIP-MCNC: NEGATIVE MG/DL
KETONES UR QL: NEGATIVE
LEUKOCYTE EST, POC: ABNORMAL
Lab: ABNORMAL
NITRITE UR-MCNC: NEGATIVE MG/ML
PH UR: 6 [PH] (ref 5–8)
PROT UR STRIP-MCNC: ABNORMAL MG/DL
RBC # UR STRIP: NEGATIVE /UL
SP GR UR: 1.03 (ref 1–1.03)
UROBILINOGEN UR QL: NORMAL

## 2023-01-30 PROCEDURE — 99213 OFFICE O/P EST LOW 20 MIN: CPT | Performed by: NURSE PRACTITIONER

## 2023-01-30 RX ORDER — CEPHALEXIN 500 MG/1
500 CAPSULE ORAL 2 TIMES DAILY
Qty: 14 CAPSULE | Refills: 0 | Status: SHIPPED | OUTPATIENT
Start: 2023-01-30

## 2023-01-30 NOTE — PROGRESS NOTES
"Chief Complaint  Urinary Tract Infection    Subjective        Mouna Manley presents to [facility] PRIMARY CARE  History of Present Illness    Patient is here today with complaint of urinary tract symptoms since last Wednesday or Thursday last week.   She reports she is having urgency, burning off and on.      Has been using azo over the counter    Objective   Vital Signs:  /62   Pulse 101   Temp 98 °F (36.7 °C)   Ht 170.2 cm (67.01\")   Wt 55 kg (121 lb 3.2 oz)   SpO2 98%   BMI 18.98 kg/m²   Estimated body mass index is 18.98 kg/m² as calculated from the following:    Height as of this encounter: 170.2 cm (67.01\").    Weight as of this encounter: 55 kg (121 lb 3.2 oz).       BMI is within normal parameters. No other follow-up for BMI required.      Physical Exam  Constitutional:       Appearance: Normal appearance.   Cardiovascular:      Rate and Rhythm: Normal rate and regular rhythm.      Pulses: Normal pulses.   Pulmonary:      Effort: Pulmonary effort is normal.      Breath sounds: Normal breath sounds.   Skin:     General: Skin is warm and dry.   Neurological:      Mental Status: She is alert.   Psychiatric:         Mood and Affect: Mood normal.         Behavior: Behavior normal.         Thought Content: Thought content normal.         Judgment: Judgment normal.        Result Review :                   Assessment and Plan   Diagnoses and all orders for this visit:    1. Dysuria (Primary)  -     POC Urinalysis Dipstick, Automated  -     Urine Culture - Urine, Urine, Clean Catch    2. UTI (urinary tract infection), uncomplicated    Other orders  -     cephalexin (Keflex) 500 MG capsule; Take 1 capsule by mouth 2 (Two) Times a Day.  Dispense: 14 capsule; Refill: 0    Will treat for UTI symptomatically.  No real problems seen on UA.  We will culture and call with results any changes needed plan of care.  Encourage patient to drink at least 64 ounces of water a day.  Also encouraged " her to urinate every 3-4 hours even if she is not feeling like she needs to go.  She verbalized understanding and is agreeable         Follow Up   No follow-ups on file.  Patient was given instructions and counseling regarding her condition or for health maintenance advice. Please see specific information pulled into the AVS if appropriate.

## 2023-01-30 NOTE — TELEPHONE ENCOUNTER
Caller: Mouna Manley     Relationship: SELF      Best call back number: [phone]    What is your medical concern? BURNING, URGENCY, CHRONIC UTI'S, TAKEN THE MEDS THAT TURNS PEE BRIGHT ORANGE, PATIENT WANTS TO KNOW IF SOMETHING CAN BE CALLED IN SHE IS IN THE PROCESS OF MOVING AND HER SIX MONTH OLD IS SICK.     How long has this issue been going on?  THE LAST COUPLE DAYS     Is your provider already aware of this issue? YES     Have you been treated for this issue? NOT THIS TIME.

## 2023-02-01 LAB
BACTERIA UR CULT: NORMAL
BACTERIA UR CULT: NORMAL

## 2023-02-02 ENCOUNTER — TELEPHONE (OUTPATIENT)
Dept: FAMILY MEDICINE CLINIC | Facility: CLINIC | Age: 23
End: 2023-02-02

## 2023-02-03 ENCOUNTER — TELEPHONE (OUTPATIENT)
Dept: FAMILY MEDICINE CLINIC | Facility: CLINIC | Age: 23
End: 2023-02-03

## 2023-02-03 ENCOUNTER — TELEMEDICINE (OUTPATIENT)
Dept: FAMILY MEDICINE CLINIC | Facility: CLINIC | Age: 23
End: 2023-02-03
Payer: MEDICAID

## 2023-02-03 DIAGNOSIS — A74.9 CHLAMYDIA: Primary | ICD-10-CM

## 2023-02-03 DIAGNOSIS — N94.9 ADNEXAL CYST: ICD-10-CM

## 2023-02-03 PROCEDURE — 99213 OFFICE O/P EST LOW 20 MIN: CPT | Performed by: NURSE PRACTITIONER

## 2023-02-03 RX ORDER — DOXYCYCLINE 100 MG/1
100 CAPSULE ORAL 2 TIMES DAILY
Qty: 14 CAPSULE | Refills: 0 | Status: SHIPPED | OUTPATIENT
Start: 2023-02-03 | End: 2023-02-06 | Stop reason: SDUPTHER
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
## 2023-02-03 NOTE — TELEPHONE ENCOUNTER
Patient called on-call service line 2/2/2023 in regards to results of hospital ER visit.  She states she cannot get prescription.  I discussed with her that I would recommend an in person visit for full STD testing, but she can make this visit later if she would prefer to do tele-health visit.  Office staff called this morning to make appointment.    Time spent on telephone call was 12 minutes.

## 2023-02-06 ENCOUNTER — TELEPHONE (OUTPATIENT)
Dept: FAMILY MEDICINE CLINIC | Facility: CLINIC | Age: 23
End: 2023-02-06
Payer: MEDICAID

## 2023-02-06 RX ORDER — DOXYCYCLINE 100 MG/1
100 CAPSULE ORAL 2 TIMES DAILY
Qty: 14 CAPSULE | Refills: 0 | Status: SHIPPED | OUTPATIENT
Start: 2023-02-06

## 2023-02-06 NOTE — TELEPHONE ENCOUNTER
Caller: Mouna Manley    Relationship: Self    Best call back number: [phone]    What is the best time to reach you: ANYTIME    Who are you requesting to speak with (clinical staff, provider,  specific staff member): KATHRYN SAWANT    What was the call regarding: PATIENT STATES HER PHARMACY WOULD NOT FILL THE ANTIBIOTIC THAT WAS CALLED IN Friday DUE TO HER ALREADY BEING ON A ANTIBIOTIC FOR SOMETHING ELSE.     PATIENT STATES SHE IS NEEDING THE PRESCRIPTION RE SENT TO HER [facility] DUE TO HER STILL HAVING THE UTI SYMPTOMS.    PREFERRED [facility]  : [id number] [place], [place] - [address] AT [facility] - [phone]  - [phone]     PATIENT STATES SHE IS REQUESTING A CALL BACK TO LET HER KNOW IF THE PRESCRIPTION CAN BE RE SENT.

## 2023-05-10 NOTE — ED NOTES
Pt. States had just previously had a miscarriage 1 month ago and got pregnant right away. Now believes a few weeks pregnant with vaginal bleeding x 1 day.      Shirlene Chand, RN  10/04/19 1334     Spoke to patient's spouse, per ambulatory verbal comment. She states that patient is having difficulty urinating, and urine flow is slowed, nearly stopped. She reports that he was able to urinate 30 minutes ago, but this was a very small amount.     Also reports lower extremity swelling that started 7-10 days ago, and is worsening. He does wear compression stockings. Legs are described as \"stiff\".    Denies abdominal or pelvic pain, body aches, fever, lower extremity redness or warmth.   Advised that patient should be seen at walk in care today for evaluation and treatment. Advised to have patient follow up with our office as needed. Patient's spouse notified. Patient's spouse verbalized understanding of information given.

## 2023-12-28 ENCOUNTER — APPOINTMENT (OUTPATIENT)
Dept: ULTRASOUND IMAGING | Facility: HOSPITAL | Age: 23
End: 2023-12-28
Payer: MEDICAID

## 2023-12-28 ENCOUNTER — HOSPITAL ENCOUNTER (EMERGENCY)
Facility: HOSPITAL | Age: 23
Discharge: HOME | End: 2023-12-28
Attending: EMERGENCY MEDICINE | Admitting: EMERGENCY MEDICINE
Payer: MEDICAID

## 2023-12-28 VITALS
WEIGHT: 111.99 LBS | OXYGEN SATURATION: 98 % | DIASTOLIC BLOOD PRESSURE: 70 MMHG | BODY MASS INDEX: 17.58 KG/M2 | SYSTOLIC BLOOD PRESSURE: 110 MMHG | HEIGHT: 67 IN | TEMPERATURE: 98 F | HEART RATE: 78 BPM | RESPIRATION RATE: 20 BRPM

## 2023-12-28 DIAGNOSIS — R10.2 PELVIC PAIN: Primary | ICD-10-CM

## 2023-12-28 LAB
ALBUMIN SERPL-MCNC: 4.8 G/DL (ref 3.5–5.2)
ALBUMIN/GLOB SERPL: 1.7 G/DL
ALP SERPL-CCNC: 51 U/L (ref 39–117)
ALT SERPL W P-5'-P-CCNC: 14 U/L (ref 1–33)
ANION GAP SERPL CALCULATED.3IONS-SCNC: 11 MMOL/L (ref 5–15)
AST SERPL-CCNC: 17 U/L (ref 1–32)
B-HCG UR QL: NEGATIVE
BACTERIA UR QL AUTO: ABNORMAL /HPF
BASOPHILS # BLD AUTO: 0 10*3/MM3 (ref 0–0.2)
BASOPHILS NFR BLD AUTO: 0.5 % (ref 0–1.5)
BILIRUB SERPL-MCNC: <0.2 MG/DL (ref 0–1.2)
BILIRUB UR QL STRIP: NEGATIVE
BUN SERPL-MCNC: 13 MG/DL (ref 6–20)
BUN/CREAT SERPL: 21.7 (ref 7–25)
C TRACH RRNA CVX QL NAA+PROBE: NOT DETECTED
CALCIUM SPEC-SCNC: 9.9 MG/DL (ref 8.6–10.5)
CHLORIDE SERPL-SCNC: 102 MMOL/L (ref 98–107)
CLARITY UR: CLEAR
CO2 SERPL-SCNC: 23 MMOL/L (ref 22–29)
COLOR UR: ABNORMAL
CREAT SERPL-MCNC: 0.6 MG/DL (ref 0.57–1)
DEPRECATED RDW RBC AUTO: 49.4 FL (ref 37–54)
EGFRCR SERPLBLD CKD-EPI 2021: 130.3 ML/MIN/1.73
EOSINOPHIL # BLD AUTO: 0 10*3/MM3 (ref 0–0.4)
EOSINOPHIL NFR BLD AUTO: 0.8 % (ref 0.3–6.2)
ERYTHROCYTE [DISTWIDTH] IN BLOOD BY AUTOMATED COUNT: 16.9 % (ref 12.3–15.4)
GLOBULIN UR ELPH-MCNC: 2.9 GM/DL
GLUCOSE SERPL-MCNC: 71 MG/DL (ref 65–99)
GLUCOSE UR STRIP-MCNC: NEGATIVE MG/DL
HCG SERPL QL: NEGATIVE
HCT VFR BLD AUTO: 35.7 % (ref 34–46.6)
HGB BLD-MCNC: 11.2 G/DL (ref 12–15.9)
HGB UR QL STRIP.AUTO: NEGATIVE
HOLD SPECIMEN: NORMAL
HOLD SPECIMEN: NORMAL
HYALINE CASTS UR QL AUTO: ABNORMAL /LPF
KETONES UR QL STRIP: NEGATIVE
LEUKOCYTE ESTERASE UR QL STRIP.AUTO: ABNORMAL
LYMPHOCYTES # BLD AUTO: 1.8 10*3/MM3 (ref 0.7–3.1)
LYMPHOCYTES NFR BLD AUTO: 29.8 % (ref 19.6–45.3)
MCH RBC QN AUTO: 24.7 PG (ref 26.6–33)
MCHC RBC AUTO-ENTMCNC: 31.4 G/DL (ref 31.5–35.7)
MCV RBC AUTO: 78.7 FL (ref 79–97)
MONOCYTES # BLD AUTO: 0.4 10*3/MM3 (ref 0.1–0.9)
MONOCYTES NFR BLD AUTO: 6.8 % (ref 5–12)
N GONORRHOEA RRNA SPEC QL NAA+PROBE: NOT DETECTED
NEUTROPHILS NFR BLD AUTO: 3.8 10*3/MM3 (ref 1.7–7)
NEUTROPHILS NFR BLD AUTO: 62.1 % (ref 42.7–76)
NITRITE UR QL STRIP: NEGATIVE
NRBC BLD AUTO-RTO: 0 /100 WBC (ref 0–0.2)
PH UR STRIP.AUTO: 5.5 [PH] (ref 5–8)
PLATELET # BLD AUTO: 301 10*3/MM3 (ref 140–450)
PMV BLD AUTO: 8.4 FL (ref 6–12)
POTASSIUM SERPL-SCNC: 4.3 MMOL/L (ref 3.5–5.2)
PROT SERPL-MCNC: 7.7 G/DL (ref 6–8.5)
PROT UR QL STRIP: NEGATIVE
RBC # BLD AUTO: 4.54 10*6/MM3 (ref 3.77–5.28)
RBC # UR STRIP: ABNORMAL /HPF
REF LAB TEST METHOD: ABNORMAL
SODIUM SERPL-SCNC: 136 MMOL/L (ref 136–145)
SP GR UR STRIP: 1.02 (ref 1–1.03)
SQUAMOUS #/AREA URNS HPF: ABNORMAL /HPF
UROBILINOGEN UR QL STRIP: ABNORMAL
WBC # UR STRIP: ABNORMAL /HPF
WBC NRBC COR # BLD AUTO: 6.2 10*3/MM3 (ref 3.4–10.8)
WHOLE BLOOD HOLD COAG: NORMAL
WHOLE BLOOD HOLD SPECIMEN: NORMAL

## 2023-12-28 PROCEDURE — 84703 CHORIONIC GONADOTROPIN ASSAY: CPT | Performed by: EMERGENCY MEDICINE

## 2023-12-28 PROCEDURE — 80053 COMPREHEN METABOLIC PANEL: CPT | Performed by: EMERGENCY MEDICINE

## 2023-12-28 PROCEDURE — 87086 URINE CULTURE/COLONY COUNT: CPT

## 2023-12-28 PROCEDURE — 93976 VASCULAR STUDY: CPT

## 2023-12-28 PROCEDURE — 81001 URINALYSIS AUTO W/SCOPE: CPT

## 2023-12-28 PROCEDURE — 85025 COMPLETE CBC W/AUTO DIFF WBC: CPT | Performed by: EMERGENCY MEDICINE

## 2023-12-28 PROCEDURE — 87491 CHLMYD TRACH DNA AMP PROBE: CPT | Performed by: EMERGENCY MEDICINE

## 2023-12-28 PROCEDURE — 76856 US EXAM PELVIC COMPLETE: CPT

## 2023-12-28 PROCEDURE — 87591 N.GONORRHOEAE DNA AMP PROB: CPT | Performed by: EMERGENCY MEDICINE

## 2023-12-28 PROCEDURE — 76830 TRANSVAGINAL US NON-OB: CPT

## 2023-12-28 PROCEDURE — 81025 URINE PREGNANCY TEST: CPT

## 2023-12-28 PROCEDURE — 99284 EMERGENCY DEPT VISIT MOD MDM: CPT
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
## 2023-12-28 NOTE — ED PROVIDER NOTES
Subjective   History of Present Illness  23-year-old female presents for multiple complaints complaints.  States that she has been having some pelvic discomfort along with some lower back pain going on for a few days.  Has had repeated UTIs.  Currently finishing course of Macrobid.  States she still having symptoms.  States that she was just tested a few days ago for STDs and pelvic infections at outside facility.'s were all negative.  States she is very concerned that she is pregnant.  Recently left her significant other.  States she had indeterminate tests at home.      Review of Systems  See HPI.  Past Medical History:   Diagnosis Date    Asthma     Depression     Eating disorder     Headache     Miscarriage 08/19/2019    X2       Allergies   Allergen Reactions    Buspar [Buspirone] Irritability       Past Surgical History:   Procedure Laterality Date    DENTAL PROCEDURE         Family History   Problem Relation Age of Onset    Diabetes Maternal Grandfather        Social History     Socioeconomic History    Marital status: Legally    Tobacco Use    Smoking status: Former     Types: Electronic Cigarette     Quit date: 2019     Years since quittin.4    Smokeless tobacco: Never    Tobacco comments:      vaped in the past but no longer   Substance and Sexual Activity    Alcohol use: No    Drug use: No    Sexual activity: Not Currently     Partners: Male     Birth control/protection: None           Objective   Physical Exam  Constitutional:  No acute distress.  Head:  Atraumatic.  Normocephalic.   Eyes:  No scleral icterus. Normal conjunctivae  ENT:  Moist mucosa.  No nasal discharge present.  Cardiovascular:  Well perfused.  Equal pulses.  Regular rate.  Normal capillary refill.    Pulmonary/Chest:  No respiratory distress.  Airway patent.  No tachypnea.  No accessory muscle usage.    Abdominal:  Nondistended. Nontender.   Extremities:  No peripheral edema.  No Deformity  Skin:  Warm,  "dry  Neurological:  Alert, awake, and appropriate.  Normal speech.      Procedures           ED Course  ED Course as of 12/28/23 1631   Thu Dec 28, 2023   1343 [facility](!): 24.7 [CH]      ED Course User Index  [CH] Chinmay Garcia MD      /70   Pulse 78   Temp 98 °F (36.7 °C)   Resp 20   Ht 170.2 cm (67\")   Wt 50.8 kg (111 lb 15.9 oz)   LMP  (LMP Unknown)   SpO2 98%   Breastfeeding No   BMI 17.54 kg/m²   Labs Reviewed   URINALYSIS W/ CULTURE IF INDICATED - Abnormal; Notable for the following components:       Result Value    Color, UA Dark Yellow (*)     Leuk Esterase, UA Trace (*)     All other components within normal limits    Narrative:     In absence of clinical symptoms, the presence of pyuria, bacteria, and/or nitrites on the urinalysis result does not correlate with infection.   URINALYSIS, MICROSCOPIC ONLY - Abnormal; Notable for the following components:    WBC, UA 3-5 (*)     Squamous Epithelial Cells, UA 3-6 (*)     All other components within normal limits   CBC WITH AUTO DIFFERENTIAL - Abnormal; Notable for the following components:    Hemoglobin 11.2 (*)     MCV 78.7 (*)     MCH 24.7 (*)     MCHC 31.4 (*)     RDW 16.9 (*)     All other components within normal limits   CHLAMYDIA TRACHOMATIS, NEISSERIA GONORRHOEAE, PCR - Normal   PREGNANCY, URINE - Normal   HCG, SERUM, QUALITATIVE - Normal   URINE CULTURE   RAINBOW DRAW    Narrative:     The following orders were created for panel order [place] Draw.  Procedure                               Abnormality         Status                     ---------                               -----------         ------                     Green Top (Gel)[[id number]]                                  Final result               Lavender Top[[id number]]                                     Final result               Gold Top - SST[[id number]]                                   Final result               Light Blue Top[[id number]]                                   " Final result                 Please view results for these tests on the individual orders.   COMPREHENSIVE METABOLIC PANEL    Narrative:     GFR Normal >60  Chronic Kidney Disease <60  Kidney Failure <15     GREEN TOP   LAVENDER TOP   GOLD TOP - SST   LIGHT BLUE TOP   CBC AND DIFFERENTIAL    Narrative:     The following orders were created for panel order CBC & Differential.  Procedure                               Abnormality         Status                     ---------                               -----------         ------                     CBC Auto Differential[[id number]]        Abnormal            Final result                 Please view results for these tests on the individual orders.     Medications - No data to display                                         Medical Decision Making  Problems Addressed:  Pelvic pain: complicated acute illness or injury    Amount and/or Complexity of Data Reviewed  Labs: ordered. Decision-making details documented in ED Course.  Radiology: ordered.    Labs and imaging unremarkable.  Urine culture sent.  Patient quite upset that we sent a urine pregnancy and so added blood test which was also negative.  I had a gonorrhea and chlamydia which were also negative.  Recommended follow-up with her OB/GYN.  Urine does not appear in infected here.  Ultrasound without acute findings.  See above radiology interpretation.  Keny LOTT.    Final diagnoses:   Pelvic pain       ED Disposition  ED Disposition       ED Disposition   Discharge    Condition   Stable    Comment   --               Anthony Morgan, APRN  [address]  [address]  [facility] [address]  [phone]    In 3 days           Medication List      No changes were made to your prescriptions during this visit.            Chinmay Garcia MD  12/28/23 8690

## 2023-12-28 NOTE — ED NOTES
Patient voiced concerns of us not doing anything for her since we only ran a urine pregnancy and not a blood sample. Rn reassured patient that we were doing everything that we could for her concern. Provider notified hcg added on. Patient stated that she had to urinate now and that her bladder was full. RN called ultrasound. Ultrasound stated that they did not have a room for her and we would have to wait until they were done with the one they already had from the Er.

## 2023-12-29 LAB — BACTERIA SPEC AEROBE CULT: NO GROWTH

## 2024-03-08 NOTE — ED NOTES
----- Message from Jerri Hughes sent at 3/8/2024 11:23 AM CST -----  Contact: Karina  Patient is calling to speak with the nurse regarding results . Reports waiting on results . Please give patient a call back at   [phone]      Pt. Aware she is being transferred to [facility] room 3608. Report given to Mitesh.     Roseanna Hutchison RN  02/07/22 0145

## 2024-05-17 ENCOUNTER — HOSPITAL ENCOUNTER (EMERGENCY)
Facility: HOSPITAL | Age: 24
Discharge: HOME | End: 2024-05-18
Payer: MEDICAID

## 2024-05-17 ENCOUNTER — APPOINTMENT (OUTPATIENT)
Dept: ULTRASOUND IMAGING | Facility: HOSPITAL | Age: 24
End: 2024-05-17
Payer: MEDICAID

## 2024-05-17 DIAGNOSIS — M54.50 ACUTE MIDLINE LOW BACK PAIN WITHOUT SCIATICA: ICD-10-CM

## 2024-05-17 DIAGNOSIS — O41.8X10 SUBCHORIONIC HEMATOMA IN FIRST TRIMESTER, SINGLE OR UNSPECIFIED FETUS: Primary | ICD-10-CM

## 2024-05-17 DIAGNOSIS — R11.2 NAUSEA AND VOMITING, UNSPECIFIED VOMITING TYPE: ICD-10-CM

## 2024-05-17 DIAGNOSIS — O46.8X1 SUBCHORIONIC HEMATOMA IN FIRST TRIMESTER, SINGLE OR UNSPECIFIED FETUS: Primary | ICD-10-CM

## 2024-05-17 LAB
ABO GROUP BLD: NORMAL
ALBUMIN SERPL-MCNC: 4.1 G/DL (ref 3.5–5.2)
ALBUMIN/GLOB SERPL: 1.8 G/DL
ALP SERPL-CCNC: 43 U/L (ref 39–117)
ALT SERPL W P-5'-P-CCNC: 9 U/L (ref 1–33)
ANION GAP SERPL CALCULATED.3IONS-SCNC: 11 MMOL/L (ref 5–15)
AST SERPL-CCNC: 14 U/L (ref 1–32)
BASOPHILS # BLD AUTO: 0.03 10*3/MM3 (ref 0–0.2)
BASOPHILS NFR BLD AUTO: 0.4 % (ref 0–1.5)
BILIRUB SERPL-MCNC: 0.3 MG/DL (ref 0–1.2)
BUN SERPL-MCNC: 7 MG/DL (ref 6–20)
BUN/CREAT SERPL: 15.6 (ref 7–25)
CALCIUM SPEC-SCNC: 8.7 MG/DL (ref 8.6–10.5)
CHLORIDE SERPL-SCNC: 106 MMOL/L (ref 98–107)
CO2 SERPL-SCNC: 19 MMOL/L (ref 22–29)
CREAT SERPL-MCNC: 0.45 MG/DL (ref 0.57–1)
DEPRECATED RDW RBC AUTO: 43.6 FL (ref 37–54)
EGFRCR SERPLBLD CKD-EPI 2021: 138.8 ML/MIN/1.73
EOSINOPHIL # BLD AUTO: 0.1 10*3/MM3 (ref 0–0.4)
EOSINOPHIL NFR BLD AUTO: 1.2 % (ref 0.3–6.2)
ERYTHROCYTE [DISTWIDTH] IN BLOOD BY AUTOMATED COUNT: 13.7 % (ref 12.3–15.4)
GLOBULIN UR ELPH-MCNC: 2.3 GM/DL
GLUCOSE SERPL-MCNC: 78 MG/DL (ref 65–99)
HCG INTACT+B SERPL-ACNC: NORMAL MIU/ML
HCT VFR BLD AUTO: 31.2 % (ref 34–46.6)
HGB BLD-MCNC: 10.1 G/DL (ref 12–15.9)
IMM GRANULOCYTES # BLD AUTO: 0.02 10*3/MM3 (ref 0–0.05)
IMM GRANULOCYTES NFR BLD AUTO: 0.2 % (ref 0–0.5)
LYMPHOCYTES # BLD AUTO: 2.82 10*3/MM3 (ref 0.7–3.1)
LYMPHOCYTES NFR BLD AUTO: 34.9 % (ref 19.6–45.3)
MCH RBC QN AUTO: 28.2 PG (ref 26.6–33)
MCHC RBC AUTO-ENTMCNC: 32.4 G/DL (ref 31.5–35.7)
MCV RBC AUTO: 87.2 FL (ref 79–97)
MONOCYTES # BLD AUTO: 0.43 10*3/MM3 (ref 0.1–0.9)
MONOCYTES NFR BLD AUTO: 5.3 % (ref 5–12)
NEUTROPHILS NFR BLD AUTO: 4.69 10*3/MM3 (ref 1.7–7)
NEUTROPHILS NFR BLD AUTO: 58 % (ref 42.7–76)
NRBC BLD AUTO-RTO: 0 /100 WBC (ref 0–0.2)
NUMBER OF DOSES: NORMAL
PLATELET # BLD AUTO: 235 10*3/MM3 (ref 140–450)
PMV BLD AUTO: 10.2 FL (ref 6–12)
POTASSIUM SERPL-SCNC: 3.7 MMOL/L (ref 3.5–5.2)
PROT SERPL-MCNC: 6.4 G/DL (ref 6–8.5)
QT INTERVAL: 362 MS
QTC INTERVAL: 403 MS
RBC # BLD AUTO: 3.58 10*6/MM3 (ref 3.77–5.28)
RH BLD: POSITIVE
SODIUM SERPL-SCNC: 136 MMOL/L (ref 136–145)
TSH SERPL DL<=0.05 MIU/L-ACNC: 1.98 UIU/ML (ref 0.27–4.2)
WBC NRBC COR # BLD AUTO: 8.09 10*3/MM3 (ref 3.4–10.8)

## 2024-05-17 PROCEDURE — 25810000003 SODIUM CHLORIDE 0.9 % SOLUTION

## 2024-05-17 PROCEDURE — 86901 BLOOD TYPING SEROLOGIC RH(D): CPT

## 2024-05-17 PROCEDURE — 76801 OB US < 14 WKS SINGLE FETUS: CPT

## 2024-05-17 PROCEDURE — 84702 CHORIONIC GONADOTROPIN TEST: CPT

## 2024-05-17 PROCEDURE — 80050 GENERAL HEALTH PANEL: CPT

## 2024-05-17 PROCEDURE — 99284 EMERGENCY DEPT VISIT MOD MDM: CPT

## 2024-05-17 PROCEDURE — 25010000002 ONDANSETRON PER 1 MG

## 2024-05-17 PROCEDURE — 76817 TRANSVAGINAL US OBSTETRIC: CPT

## 2024-05-17 PROCEDURE — 93976 VASCULAR STUDY: CPT

## 2024-05-17 PROCEDURE — 93005 ELECTROCARDIOGRAM TRACING: CPT

## 2024-05-17 PROCEDURE — 96374 THER/PROPH/DIAG INJ IV PUSH: CPT

## 2024-05-17 PROCEDURE — 86900 BLOOD TYPING SEROLOGIC ABO: CPT

## 2024-05-17 RX ORDER — ACETAMINOPHEN 500 MG
1000 TABLET ORAL ONCE
Status: COMPLETED | OUTPATIENT
Start: 2024-05-17 | End: 2024-05-17

## 2024-05-17 RX ORDER — ONDANSETRON 2 MG/ML
4 INJECTION INTRAMUSCULAR; INTRAVENOUS ONCE
Status: COMPLETED | OUTPATIENT
Start: 2024-05-17 | End: 2024-05-17

## 2024-05-17 RX ORDER — SODIUM CHLORIDE 0.9 % (FLUSH) 0.9 %
10 SYRINGE (ML) INJECTION AS NEEDED
Status: DISCONTINUED | OUTPATIENT
Start: 2024-05-17 | End: 2024-05-18 | Stop reason: HOSPADM

## 2024-05-17 RX ADMIN — SODIUM CHLORIDE 500 ML: 9 INJECTION, SOLUTION INTRAVENOUS at 22:08

## 2024-05-17 RX ADMIN — ACETAMINOPHEN 1000 MG: 500 TABLET ORAL at 22:08

## 2024-05-17 RX ADMIN — ONDANSETRON 4 MG: 2 INJECTION INTRAMUSCULAR; INTRAVENOUS at 22:09

## 2024-05-18 VITALS
RESPIRATION RATE: 16 BRPM | HEIGHT: 67 IN | TEMPERATURE: 98.1 F | HEART RATE: 74 BPM | WEIGHT: 121.25 LBS | SYSTOLIC BLOOD PRESSURE: 112 MMHG | OXYGEN SATURATION: 100 % | BODY MASS INDEX: 19.03 KG/M2 | DIASTOLIC BLOOD PRESSURE: 76 MMHG

## 2024-05-18 LAB
BILIRUB UR QL STRIP: NEGATIVE
CLARITY UR: CLEAR
COLOR UR: YELLOW
GLUCOSE UR STRIP-MCNC: NEGATIVE MG/DL
HGB UR QL STRIP.AUTO: NEGATIVE
KETONES UR QL STRIP: ABNORMAL
LEUKOCYTE ESTERASE UR QL STRIP.AUTO: NEGATIVE
NITRITE UR QL STRIP: NEGATIVE
PH UR STRIP.AUTO: 5.5 [PH] (ref 5–8)
PROT UR QL STRIP: NEGATIVE
SP GR UR STRIP: 1.01 (ref 1–1.03)
UROBILINOGEN UR QL STRIP: ABNORMAL

## 2024-05-18 PROCEDURE — 81003 URINALYSIS AUTO W/O SCOPE: CPT

## 2024-05-18 NOTE — ED PROVIDER NOTES
"Subjective   History of Present Illness  Patient is a pleasant 24-year-old  female with a history of multiple miscarriages presents with complaints of syncopal episode that occurred today while she was in the shower.  Patient reports being about 7 weeks pregnant but saw her OB/GYN yesterday and had an ultrasound with estimation of 6 weeks pregnancy so she is concerned that her pregnancy is \"already behind\" and given her history of miscarriages concerned that she could be miscarrying as well.  She has had no vaginal discharge or bleeding but states that her low back has been painful and is concerned that this could be an early sign of miscarriage.  She has had nausea and vomiting with no significant dizziness.  No fever or dysuria.  No use of drugs, alcohol or tobacco.    OB: [facility]  PCP: none      Review of Systems   Constitutional:  Negative for appetite change and fever.   HENT:  Negative for congestion.    Respiratory:  Negative for shortness of breath.    Cardiovascular:  Negative for chest pain.   Gastrointestinal:  Positive for abdominal pain, nausea and vomiting.   Genitourinary:  Negative for dysuria.   Musculoskeletal:  Positive for back pain.   Neurological:  Negative for light-headedness.   Psychiatric/Behavioral:  The patient is nervous/anxious.    All other systems reviewed and are negative.      Past Medical History:   Diagnosis Date    Asthma     Depression     Eating disorder     Headache     Miscarriage 08/19/2019    X2       Allergies   Allergen Reactions    Buspar [Buspirone] Irritability       Past Surgical History:   Procedure Laterality Date    DENTAL PROCEDURE         Family History   Problem Relation Age of Onset    Diabetes Maternal Grandfather        Social History     Socioeconomic History    Marital status: Legally    Tobacco Use    Smoking status: Former     Types: Electronic Cigarette     Quit date: 2019     Years since quittin.8    Smokeless tobacco: Never " "   Tobacco comments:      vaped in the past but no longer   Substance and Sexual Activity    Alcohol use: No    Drug use: No    Sexual activity: Not Currently     Partners: Male     Birth control/protection: None           Objective   Physical Exam  Vitals and nursing note reviewed.   Constitutional:       General: She is not in acute distress.     Appearance: Normal appearance. She is normal weight. She is not ill-appearing.   HENT:      Head: Normocephalic and atraumatic.   Eyes:      Pupils: Pupils are equal, round, and reactive to light.   Cardiovascular:      Rate and Rhythm: Normal rate and regular rhythm.      Heart sounds: Normal heart sounds. No murmur heard.  Pulmonary:      Effort: No respiratory distress.      Breath sounds: Normal breath sounds.   Abdominal:      General: Bowel sounds are normal.      Tenderness: There is no abdominal tenderness.   Musculoskeletal:         General: No tenderness. Normal range of motion.   Neurological:      Mental Status: She is alert and oriented to person, place, and time.         Procedures           ED Course      BP 99/61   Pulse 70   Temp 98 °F (36.7 °C) (Oral)   Resp 17   Ht 170.2 cm (67\")   Wt 55 kg (121 lb 4.1 oz)   LMP  (LMP Unknown)   SpO2 100%   BMI 18.99 kg/m²   Labs Reviewed   COMPREHENSIVE METABOLIC PANEL - Abnormal; Notable for the following components:       Result Value    Creatinine 0.45 (*)     CO2 19.0 (*)     All other components within normal limits    Narrative:     GFR Normal >60  Chronic Kidney Disease <60  Kidney Failure <15     URINALYSIS W/ MICROSCOPIC IF INDICATED (NO CULTURE) - Abnormal; Notable for the following components:    Ketones, UA Trace (*)     All other components within normal limits    Narrative:     Urine microscopic not indicated.   CBC WITH AUTO DIFFERENTIAL - Abnormal; Notable for the following components:    RBC 3.58 (*)     Hemoglobin 10.1 (*)     Hematocrit 31.2 (*)     All other components within normal limits "   TSH - Normal   HCG, QUANTITATIVE, PREGNANCY    Narrative:     HCG Ranges by Gestational Age    Females - non-pregnant premenopausal   </= 1mIU/mL HCG  Females - postmenopausal               </= 7mIU/mL HCG    3 Weeks       5.4   -      72 mIU/mL  4 Weeks      10.2   -     708 mIU/mL  5 Weeks       217   -   8,245 mIU/mL  6 Weeks       152   -  32,177 mIU/mL  7 Weeks     4,059   - 153,767 mIU/mL  8 Weeks    31,366   - 149,094 mIU/mL  9 Weeks    59,109   - 135,901 mIU/mL  10 Weeks   44,186   - 170,409 mIU/mL  12 Weeks   27,107   - 201,615 mIU/mL  14 Weeks   24,302   -  93,646 mIU/mL  15 Weeks   12,540   -  69,747 mIU/mL  16 Weeks    8,904   -  55,332 mIU/mL  17 Weeks    8,240   -  51,793 mIU/mL  18 Weeks    9,649   -  55,271 mIU/mL      RHIG EVALUATION   DOSES OF RH IMMUNE GLOBULIN   CBC AND DIFFERENTIAL    Narrative:     The following orders were created for panel order CBC & Differential.  Procedure                               Abnormality         Status                     ---------                               -----------         ------                     CBC Auto Differential[[id number]]        Abnormal            Final result                 Please view results for these tests on the individual orders.     Medications   sodium chloride 0.9 % flush 10 mL (has no administration in time range)   sodium chloride 0.9 % bolus 500 mL (500 mL Intravenous New Bag 24)   ondansetron (ZOFRAN) injection 4 mg (4 mg Intravenous Given 24)   acetaminophen (TYLENOL) tablet 1,000 mg (1,000 mg Oral Given 24)     US Ob Transvaginal    Result Date: 2024  Impression: There is a living intrauterine pregnancy dating 6 weeks 3 days with heart rate of 116 bpm. However, low-attenuation cystic areas surround 2 separate regions of the gestational sac consistent with perigestational hemorrhage uprising a total of approximately 30% the circumference of the gestational sac. Follow-up beta-hCG  levels and consideration of repeat ultrasound may be required. Electronically Signed: Mark Lowry MD  5/18/2024 12:21 AM EDT  Workstation ID: [id number]    US Ob < 14 Weeks Single or First Gestation    Result Date: 5/18/2024  Impression: There is a living intrauterine pregnancy dating 6 weeks 3 days with heart rate of 116 bpm. However, low-attenuation cystic areas surround 2 separate regions of the gestational sac consistent with perigestational hemorrhage uprising a total of approximately 30% the circumference of the gestational sac. Follow-up beta-hCG levels and consideration of repeat ultrasound may be required. Electronically Signed: Mark Lowry MD  5/18/2024 12:21 AM EDT  Workstation ID: [id number]                                          Medical Decision Making  Amount and/or Complexity of Data Reviewed  Labs: ordered.  Radiology: ordered.    Risk  OTC drugs.  Prescription drug management.    Patient is a pleasant 24-year-old  female with history of miscarriage who presents to the emergency room with complaints of low back pain, nausea and vomiting with a syncopal episode that occurred earlier tonight   And is concerned for miscarriage.  On exam, she is alert, nondiaphoretic.  She answers questions appropriately though she seems anxious.  Normal S1/S2 without clicks murmurs.  No JVD.  Lungs are clear to auscultation in all fields.  Her abdomen is soft and nontender with hypoactive bowel sounds throughout.  No CVA tenderness.  Initial differentials include threatened miscarriage, acute UTI, dehydration, electrolyte imbalance.  This is not a complete list.    IV was established labs were obtained.  Patient received above examination.  Her CBC is without leukocytosis.  She has mild anemia of 10.1 but this is baseline.  Her CMP is with normal kidney function and LFTs.  Urinalysis is negative for acute UTI but patient does appear to be mildly dehydrated with ketones present.  TSH is not elevated.  A CT study  was considered but not completed at this time because patient does not have any focal deficits, headache, dizziness or evidence of head trauma.  Ultrasound was completed to evaluate viability of pregnancy.  Findings were consistent with a living intrauterine pregnancy dating 6 weeks and 3 days with a heart rate of 116.  Low-attenuation cystic areas in 2 separate regions of the gestational sac consistent with perigestational hemorrhage apprising a total of about 30% of circumference of gestational sac.  Follow-up beta levels and repeat ultrasound or recommended.  Upon reassessment, I discussed this with patient, who states this happened with her son who is living at this time.  I encouraged her to continue pelvic rest and avoid heavy lifting or straining.  She will follow-up to her OB/GYN on Monday and will return if her symptoms continue or worsen.  She verbalized understanding and is agreeable.  Patient is ambulatory upright, steadily without assistance upon discharge.  No acute distress noted.    I discussed the findings with patient who voices understanding of discharge instructions, signs and symptoms requiring return to the ED; discharged improved and stable condition with follow-up for reevaluation.    Patient is aware that discharge does not mean that nothing is wrong but it indicates no emergency is present and they must continue care with follow-up as given below or physician of their choice.    This document is intended for medical expert use only.  Reading of this document by patients and/or patient's family without participating medical staff guidance may result in misinterpretation and unintended morbidity.  Any interpretation of such data is the responsibility of the patient and/or family member responsible for the patient in concert with their primary or specialist providers, not to be left for sources of online search as such as [employer], [employer] or similar queries.  Relying on these approaches to  knowledge may result in misinterpretation, misguided goals of care and even death should patient or family members try recommendations outside of the realm of professional medical care in a supervised inpatient environment.    This medical document was created using Dragon dictation system. Some errors in speech recognition may occur.    Final diagnoses:   Subchorionic hematoma in first trimester, single or unspecified fetus   Acute midline low back pain without sciatica   Nausea and vomiting, unspecified vomiting type       ED Disposition  ED Disposition       ED Disposition   Discharge    Condition   Stable    Comment   --               [facility] [address] [address] [phone]          PATIENT CONNECTION - [facility] [address] [phone]             Medication List      No changes were made to your prescriptions during this visit.            Sneha Martinez, APRN  05/18/24 0130

## 2024-05-18 NOTE — DISCHARGE INSTRUCTIONS
Continue monitoring your symptoms for bleeding.  Rest.  Get plenty of fluids.  Avoid heavy lifting or straining.  Do not stick anything in your vagina including tampons and avoid sexual intercourse.    Follow-up with your OB/GYN as soon as possible for further evaluation of beta-hCG levels and subchorionic hemorrhage.  Follow-up with primary care provider as needed.    Return to the ER for new or worsening symptoms.

## 2024-05-26 LAB
QT INTERVAL: 362 MS
QTC INTERVAL: 403 MS

## 2024-05-31 ENCOUNTER — HOSPITAL ENCOUNTER (EMERGENCY)
Facility: HOSPITAL | Age: 24
Discharge: HOME | End: 2024-05-31
Attending: EMERGENCY MEDICINE
Payer: MEDICAID

## 2024-05-31 ENCOUNTER — APPOINTMENT (OUTPATIENT)
Dept: ULTRASOUND IMAGING | Facility: HOSPITAL | Age: 24
End: 2024-05-31
Payer: MEDICAID

## 2024-05-31 VITALS
BODY MASS INDEX: 19.1 KG/M2 | RESPIRATION RATE: 18 BRPM | TEMPERATURE: 98.2 F | WEIGHT: 121.69 LBS | HEART RATE: 98 BPM | DIASTOLIC BLOOD PRESSURE: 65 MMHG | OXYGEN SATURATION: 94 % | SYSTOLIC BLOOD PRESSURE: 115 MMHG | HEIGHT: 67 IN

## 2024-05-31 DIAGNOSIS — R11.2 NAUSEA AND VOMITING, UNSPECIFIED VOMITING TYPE: ICD-10-CM

## 2024-05-31 DIAGNOSIS — Z3A.08 8 WEEKS GESTATION OF PREGNANCY: Primary | ICD-10-CM

## 2024-05-31 LAB
ALBUMIN SERPL-MCNC: 4.3 G/DL (ref 3.5–5.2)
ALBUMIN/GLOB SERPL: 1.5 G/DL
ALP SERPL-CCNC: 56 U/L (ref 39–117)
ALT SERPL W P-5'-P-CCNC: 13 U/L (ref 1–33)
ANION GAP SERPL CALCULATED.3IONS-SCNC: 10.6 MMOL/L (ref 5–15)
AST SERPL-CCNC: 19 U/L (ref 1–32)
BASOPHILS # BLD AUTO: 0.02 10*3/MM3 (ref 0–0.2)
BASOPHILS NFR BLD AUTO: 0.2 % (ref 0–1.5)
BILIRUB SERPL-MCNC: 0.2 MG/DL (ref 0–1.2)
BILIRUB UR QL STRIP: NEGATIVE
BUN SERPL-MCNC: 9 MG/DL (ref 6–20)
BUN/CREAT SERPL: 17 (ref 7–25)
CALCIUM SPEC-SCNC: 9.8 MG/DL (ref 8.6–10.5)
CHLORIDE SERPL-SCNC: 102 MMOL/L (ref 98–107)
CLARITY UR: CLEAR
CO2 SERPL-SCNC: 22.4 MMOL/L (ref 22–29)
COLOR UR: YELLOW
CREAT SERPL-MCNC: 0.53 MG/DL (ref 0.57–1)
DEPRECATED RDW RBC AUTO: 43.4 FL (ref 37–54)
EGFRCR SERPLBLD CKD-EPI 2021: 133.5 ML/MIN/1.73
EOSINOPHIL # BLD AUTO: 0.04 10*3/MM3 (ref 0–0.4)
EOSINOPHIL NFR BLD AUTO: 0.4 % (ref 0.3–6.2)
ERYTHROCYTE [DISTWIDTH] IN BLOOD BY AUTOMATED COUNT: 13.4 % (ref 12.3–15.4)
GLOBULIN UR ELPH-MCNC: 2.9 GM/DL
GLUCOSE SERPL-MCNC: 81 MG/DL (ref 65–99)
GLUCOSE UR STRIP-MCNC: NEGATIVE MG/DL
HCG INTACT+B SERPL-ACNC: NORMAL MIU/ML
HCT VFR BLD AUTO: 34.3 % (ref 34–46.6)
HGB BLD-MCNC: 10.9 G/DL (ref 12–15.9)
HGB UR QL STRIP.AUTO: NEGATIVE
IMM GRANULOCYTES # BLD AUTO: 0.03 10*3/MM3 (ref 0–0.05)
IMM GRANULOCYTES NFR BLD AUTO: 0.3 % (ref 0–0.5)
KETONES UR QL STRIP: NEGATIVE
LEUKOCYTE ESTERASE UR QL STRIP.AUTO: NEGATIVE
LIPASE SERPL-CCNC: 27 U/L (ref 13–60)
LYMPHOCYTES # BLD AUTO: 2.24 10*3/MM3 (ref 0.7–3.1)
LYMPHOCYTES NFR BLD AUTO: 21.4 % (ref 19.6–45.3)
MCH RBC QN AUTO: 27.9 PG (ref 26.6–33)
MCHC RBC AUTO-ENTMCNC: 31.8 G/DL (ref 31.5–35.7)
MCV RBC AUTO: 87.9 FL (ref 79–97)
MONOCYTES # BLD AUTO: 0.44 10*3/MM3 (ref 0.1–0.9)
MONOCYTES NFR BLD AUTO: 4.2 % (ref 5–12)
NEUTROPHILS NFR BLD AUTO: 7.7 10*3/MM3 (ref 1.7–7)
NEUTROPHILS NFR BLD AUTO: 73.5 % (ref 42.7–76)
NITRITE UR QL STRIP: NEGATIVE
NRBC BLD AUTO-RTO: 0 /100 WBC (ref 0–0.2)
PH UR STRIP.AUTO: 6.5 [PH] (ref 5–8)
PLATELET # BLD AUTO: 275 10*3/MM3 (ref 140–450)
PMV BLD AUTO: 9.9 FL (ref 6–12)
POTASSIUM SERPL-SCNC: 3.2 MMOL/L (ref 3.5–5.2)
PROT SERPL-MCNC: 7.2 G/DL (ref 6–8.5)
PROT UR QL STRIP: NEGATIVE
RBC # BLD AUTO: 3.9 10*6/MM3 (ref 3.77–5.28)
SODIUM SERPL-SCNC: 135 MMOL/L (ref 136–145)
SP GR UR STRIP: 1.02 (ref 1–1.03)
UROBILINOGEN UR QL STRIP: NORMAL
WBC NRBC COR # BLD AUTO: 10.47 10*3/MM3 (ref 3.4–10.8)

## 2024-05-31 PROCEDURE — 83690 ASSAY OF LIPASE: CPT | Performed by: EMERGENCY MEDICINE

## 2024-05-31 PROCEDURE — 76801 OB US < 14 WKS SINGLE FETUS: CPT

## 2024-05-31 PROCEDURE — 25010000002 METOCLOPRAMIDE PER 10 MG: Performed by: EMERGENCY MEDICINE

## 2024-05-31 PROCEDURE — 85025 COMPLETE CBC W/AUTO DIFF WBC: CPT | Performed by: EMERGENCY MEDICINE

## 2024-05-31 PROCEDURE — 93005 ELECTROCARDIOGRAM TRACING: CPT

## 2024-05-31 PROCEDURE — 76817 TRANSVAGINAL US OBSTETRIC: CPT

## 2024-05-31 PROCEDURE — 93005 ELECTROCARDIOGRAM TRACING: CPT | Performed by: EMERGENCY MEDICINE

## 2024-05-31 PROCEDURE — 84702 CHORIONIC GONADOTROPIN TEST: CPT | Performed by: EMERGENCY MEDICINE

## 2024-05-31 PROCEDURE — 99284 EMERGENCY DEPT VISIT MOD MDM: CPT

## 2024-05-31 PROCEDURE — 25810000003 LACTATED RINGERS SOLUTION: Performed by: EMERGENCY MEDICINE

## 2024-05-31 PROCEDURE — 93976 VASCULAR STUDY: CPT

## 2024-05-31 PROCEDURE — 81003 URINALYSIS AUTO W/O SCOPE: CPT | Performed by: EMERGENCY MEDICINE

## 2024-05-31 PROCEDURE — 80053 COMPREHEN METABOLIC PANEL: CPT | Performed by: EMERGENCY MEDICINE

## 2024-05-31 PROCEDURE — 96374 THER/PROPH/DIAG INJ IV PUSH: CPT

## 2024-05-31 RX ORDER — ACETAMINOPHEN 325 MG/1
650 TABLET ORAL ONCE
Status: COMPLETED | OUTPATIENT
Start: 2024-05-31 | End: 2024-05-31

## 2024-05-31 RX ORDER — METOCLOPRAMIDE 10 MG/1
10 TABLET ORAL 4 TIMES DAILY PRN
Qty: 30 TABLET | Refills: 0 | Status: SHIPPED | OUTPATIENT
Start: 2024-05-31

## 2024-05-31 RX ORDER — METOCLOPRAMIDE HYDROCHLORIDE 5 MG/ML
10 INJECTION INTRAMUSCULAR; INTRAVENOUS ONCE
Status: COMPLETED | OUTPATIENT
Start: 2024-05-31 | End: 2024-05-31

## 2024-05-31 RX ORDER — SODIUM CHLORIDE 0.9 % (FLUSH) 0.9 %
10 SYRINGE (ML) INJECTION AS NEEDED
Status: DISCONTINUED | OUTPATIENT
Start: 2024-05-31 | End: 2024-05-31 | Stop reason: HOSPADM

## 2024-05-31 RX ADMIN — SODIUM CHLORIDE, POTASSIUM CHLORIDE, SODIUM LACTATE AND CALCIUM CHLORIDE 1000 ML: 600; 310; 30; 20 INJECTION, SOLUTION INTRAVENOUS at 18:08

## 2024-05-31 RX ADMIN — METOCLOPRAMIDE 10 MG: 5 INJECTION, SOLUTION INTRAMUSCULAR; INTRAVENOUS at 18:08

## 2024-05-31 RX ADMIN — ACETAMINOPHEN 650 MG: 325 TABLET, FILM COATED ORAL at 21:37

## 2024-06-01 LAB
QT INTERVAL: 364 MS
QTC INTERVAL: 405 MS

## 2024-06-01 NOTE — DISCHARGE INSTRUCTIONS
Follow-up with your OB/GYN next week call Monday.  Intrauterine pregnancy 8 weeks heart rate 164 subchorionic hemorrhage unchanged  Reglan sent to your pharmacy.  Return for uncontrolled vomiting vomiting blood black or bloody stool severe abdominal pain dizziness passing out vaginal bleeding or any other new or worse problems or concerns return immediately to the ER.

## 2024-06-01 NOTE — ED PROVIDER NOTES
Subjective   History of Present Illness  Chief complaint nausea vomiting pregnant    History of present illness 24-year-old female who is 8 weeks pregnant with multiple issues with previous pregnancies who was seen here 2 weeks ago with some nausea and vomiting had an ultrasound which showed she had some implantation hemorrhage.  The patient went home she states over the last 3 days she has had some pain in her lower abdomen mainly vomiting cannot hold anything down no diarrhea no bleeding no fever no chills nothing seem to trigger it nothing makes it better or worse no one at home similar illness no dysuria frequency no urinary complaints no discharge no STD risk.  Nothing makes it better or worse whenever she tries to eat or drink anything.  Unable to hold anything down feels lightheaded and dizzy.  Reports that her OB/GYN does not want to use Zofran they had called her in some diplegia's but it was too expensive to get.  And needed preauthorization      Review of Systems   Constitutional:  Negative for chills and fever.   Respiratory:  Negative for chest tightness and shortness of breath.    Gastrointestinal:  Positive for abdominal pain and vomiting.   Genitourinary:  Negative for difficulty urinating, dysuria and vaginal bleeding.   Neurological:  Positive for dizziness and light-headedness.       Past Medical History:   Diagnosis Date    Asthma     Depression     Eating disorder     Headache     Miscarriage 08/19/2019    X2       Allergies   Allergen Reactions    Buspar [Buspirone] Irritability       Past Surgical History:   Procedure Laterality Date    DENTAL PROCEDURE         Family History   Problem Relation Age of Onset    Diabetes Maternal Grandfather        Social History     Socioeconomic History    Marital status: Legally    Tobacco Use    Smoking status: Former     Types: Electronic Cigarette     Quit date: 2019     Years since quittin.9    Smokeless tobacco: Never    Tobacco  comments:      vaped in the past but no longer   Substance and Sexual Activity    Alcohol use: No    Drug use: No    Sexual activity: Not Currently     Partners: Male     Birth control/protection: None     Prior to Admission medications    Medication Sig Start Date End Date Taking? Authorizing Provider   FLUoxetine (PROzac) 20 MG capsule Take 1 capsule by mouth Daily. 12/9/22   Anthony Morgan APRN   metoclopramide (REGLAN) 10 MG tablet Take 1 tablet by mouth 4 (Four) Times a Day As Needed (Nausea vomiting). 5/31/24   Heron Estrada MD   vitamin D (ERGOCALCIFEROL) 1.25 MG (50615 UT) capsule capsule Take 1 capsule by mouth 1 (One) Time Per Week. 12/14/22   Anthony Morgan APRN   Patient is not on regular      Objective   Physical Exam  Constitutional 21-year-old female awake alert she is in no distress resting comfortably on the stretcher triage vital signs reviewed.  HEENT extraocular muscles are intact pupils equal and reactive no photophobia.  Mouth is clear and moist neck supple no adenopathy no meningeal signs lungs clear no retractions back no CVA tenderness heart regular without murmur abdomen soft nontender nondistended good bowel sounds no peritoneal findings or other masses extremities good skin turgor cap refill no swelling no rash no palpable cords or Homans' sign no petechiae no purpura neurologic awake alert follows commands no facial symmetry speech normal without focal weakness  Procedures           ED Course      Results for orders placed or performed during the hospital encounter of 05/31/24   Comprehensive Metabolic Panel    Specimen: Blood   Result Value Ref Range    Glucose 81 65 - 99 mg/dL    BUN 9 6 - 20 mg/dL    Creatinine 0.53 (L) 0.57 - 1.00 mg/dL    Sodium 135 (L) 136 - 145 mmol/L    Potassium 3.2 (L) 3.5 - 5.2 mmol/L    Chloride 102 98 - 107 mmol/L    CO2 22.4 22.0 - 29.0 mmol/L    Calcium 9.8 8.6 - 10.5 mg/dL    Total Protein 7.2 6.0 - 8.5 g/dL    Albumin 4.3 3.5 - 5.2 g/dL    ALT  (SGPT) 13 1 - 33 U/L    AST (SGOT) 19 1 - 32 U/L    Alkaline Phosphatase 56 39 - 117 U/L    Total Bilirubin 0.2 0.0 - 1.2 mg/dL    Globulin 2.9 gm/dL    A/G Ratio 1.5 g/dL    BUN/Creatinine Ratio 17.0 7.0 - 25.0    Anion Gap 10.6 5.0 - 15.0 mmol/L    eGFR 133.5 >60.0 mL/min/1.73   Lipase    Specimen: Blood   Result Value Ref Range    Lipase 27 13 - 60 U/L   hCG, Quantitative, Pregnancy    Specimen: Blood   Result Value Ref Range    HCG Quantitative 130,529.00 mIU/mL   Urinalysis With Microscopic If Indicated (No Culture) - Urine, Clean Catch    Specimen: Urine, Clean Catch   Result Value Ref Range    Color, UA Yellow Yellow, Straw    Appearance, UA Clear Clear    pH, UA 6.5 5.0 - 8.0    Specific Gravity, UA 1.022 1.005 - 1.030    Glucose, UA Negative Negative    Ketones, UA Negative Negative    Bilirubin, UA Negative Negative    Blood, UA Negative Negative    Protein, UA Negative Negative    Leuk Esterase, UA Negative Negative    Nitrite, UA Negative Negative    Urobilinogen, UA 0.2 E.U./dL 0.2 - 1.0 E.U./dL   CBC Auto Differential    Specimen: Blood   Result Value Ref Range    WBC 10.47 3.40 - 10.80 10*3/mm3    RBC 3.90 3.77 - 5.28 10*6/mm3    Hemoglobin 10.9 (L) 12.0 - 15.9 g/dL    Hematocrit 34.3 34.0 - 46.6 %    MCV 87.9 79.0 - 97.0 fL    MCH 27.9 26.6 - 33.0 pg    MCHC 31.8 31.5 - 35.7 g/dL    RDW 13.4 12.3 - 15.4 %    RDW-SD 43.4 37.0 - 54.0 fl    MPV 9.9 6.0 - 12.0 fL    Platelets 275 140 - 450 10*3/mm3    Neutrophil % 73.5 42.7 - 76.0 %    Lymphocyte % 21.4 19.6 - 45.3 %    Monocyte % 4.2 (L) 5.0 - 12.0 %    Eosinophil % 0.4 0.3 - 6.2 %    Basophil % 0.2 0.0 - 1.5 %    Immature Grans % 0.3 0.0 - 0.5 %    Neutrophils, Absolute 7.70 (H) 1.70 - 7.00 10*3/mm3    Lymphocytes, Absolute 2.24 0.70 - 3.10 10*3/mm3    Monocytes, Absolute 0.44 0.10 - 0.90 10*3/mm3    Eosinophils, Absolute 0.04 0.00 - 0.40 10*3/mm3    Basophils, Absolute 0.02 0.00 - 0.20 10*3/mm3    Immature Grans, Absolute 0.03 0.00 - 0.05 10*3/mm3     nRBC 0.0 0.0 - 0.2 /100 WBC   ECG 12 Lead Other; dizziness   Result Value Ref Range    QT Interval 364 ms    QTC Interval 405 ms     US Ob Transvaginal    Result Date: 5/31/2024  Single live intrauterine pregnancy with an estimated gestational age of Electronically Signed: Clarence Solorio MD  5/31/2024 8:07 PM EDT  Workstation ID: [id number]    US Ob < 14 Weeks Single or First Gestation    Result Date: 5/31/2024  Single live intrauterine pregnancy with an estimated gestational age of Electronically Signed: Clarence Solorio MD  5/31/2024 8:07 PM EDT  Workstation ID: [id number]   Medications   sodium chloride 0.9 % flush 10 mL (has no administration in time range)   acetaminophen (TYLENOL) tablet 650 mg (has no administration in time range)   lactated ringers bolus 1,000 mL (0 mL Intravenous Stopped 5/31/24 2000)   metoclopramide (REGLAN) injection 10 mg (10 mg Intravenous Given 5/31/24 1808)                                              Medical Decision Making  Medical decision making.  Patient given IV lactated Ringer's x 1 L.  Given Reglan 10 mg IV.  Labs obtained my independent review comprehensive metabolic profile unremarkable other than potassium of 3.2.  Liver lipase unremarkable pregnancy test 130,529 urine was negative no ketones.  CBC unremarkable hemoglobin 10.9.  Ultrasound pain read by radiology report reviewed by me single living intrauterine pregnancy estimated gestational sac at 8 weeks heart rate in the 164 range patient still has the hemorrhages noted on previous ultrasound pregnancy test is up from 53,000 on 17 May.  Ultrasound shows living anterior pregnancy.  I do not see any evidence of an ectopic pregnancy or recurrent miscarriages I will see evidence of acute appendicitis diverticulitis sepsis bacteremia or acute intra-abdominal process based on my history and physical clinical findings.  She had no further vomiting she tolerated p.o. fluids given Tylenol for headache on repeat exam her abdomen soft  without tenderness no peritoneal findings.  She will be discharged home on Reglan she has an appoint with her OB next week we talked about what to return for improved ER condition    Problems Addressed:  8 weeks gestation of pregnancy: complicated acute illness or injury  Nausea and vomiting, unspecified vomiting type: complicated acute illness or injury    Amount and/or Complexity of Data Reviewed  External Data Reviewed: labs and radiology.  Labs: ordered. Decision-making details documented in ED Course.  Radiology: ordered.  ECG/medicine tests: ordered.    Risk  OTC drugs.  Prescription drug management.        Final diagnoses:   8 weeks gestation of pregnancy   Nausea and vomiting, unspecified vomiting type       ED Disposition  ED Disposition       ED Disposition   Discharge    Condition   Stable    Comment   --               PATIENT CONNECTION - [facility] [address] [phone]  In 3 days           Medication List        New Prescriptions      metoclopramide 10 MG tablet  Commonly known as: REGLAN  Take 1 tablet by mouth 4 (Four) Times a Day As Needed (Nausea vomiting).            Stop      cephalexin 500 MG capsule  Commonly known as: Keflex     doxycycline 100 MG capsule  Commonly known as: MONODOX               Where to Get Your Medications        These medications were sent to [id number] - [place], [place] - [address] AT [facility] - [phone]  - [phone]   [address], [place] [address]      Phone: [phone]   metoclopramide 10 MG tablet            Heron Estrada MD  06/01/24 5788

## 2024-07-31 ENCOUNTER — APPOINTMENT (OUTPATIENT)
Dept: MRI IMAGING | Facility: HOSPITAL | Age: 24
End: 2024-07-31
Payer: MEDICAID

## 2024-07-31 ENCOUNTER — APPOINTMENT (OUTPATIENT)
Dept: ULTRASOUND IMAGING | Facility: HOSPITAL | Age: 24
End: 2024-07-31
Payer: MEDICAID

## 2024-07-31 ENCOUNTER — HOSPITAL ENCOUNTER (OUTPATIENT)
Facility: HOSPITAL | Age: 24
Setting detail: OBSERVATION
Discharge: HOME | End: 2024-08-01
Attending: EMERGENCY MEDICINE | Admitting: OBSTETRICS & GYNECOLOGY
Payer: MEDICAID

## 2024-07-31 DIAGNOSIS — R10.9 ABDOMINAL PAIN DURING PREGNANCY IN SECOND TRIMESTER: Primary | ICD-10-CM

## 2024-07-31 DIAGNOSIS — O26.892 ABDOMINAL PAIN DURING PREGNANCY IN SECOND TRIMESTER: Primary | ICD-10-CM

## 2024-07-31 LAB
ABO GROUP BLD: NORMAL
ALBUMIN SERPL-MCNC: 3.9 G/DL (ref 3.5–5.2)
ALBUMIN/GLOB SERPL: 1.6 G/DL
ALP SERPL-CCNC: 51 U/L (ref 39–117)
ALT SERPL W P-5'-P-CCNC: 6 U/L (ref 1–33)
ANION GAP SERPL CALCULATED.3IONS-SCNC: 9.3 MMOL/L (ref 5–15)
AST SERPL-CCNC: 14 U/L (ref 1–32)
BACTERIA UR QL AUTO: ABNORMAL /HPF
BASOPHILS # BLD AUTO: 0.02 10*3/MM3 (ref 0–0.2)
BASOPHILS NFR BLD AUTO: 0.2 % (ref 0–1.5)
BILIRUB SERPL-MCNC: 0.2 MG/DL (ref 0–1.2)
BILIRUB UR QL STRIP: NEGATIVE
BUN SERPL-MCNC: 7 MG/DL (ref 6–20)
BUN/CREAT SERPL: 14 (ref 7–25)
C TRACH RRNA CVX QL NAA+PROBE: NOT DETECTED
CALCIUM SPEC-SCNC: 8.8 MG/DL (ref 8.6–10.5)
CHLORIDE SERPL-SCNC: 105 MMOL/L (ref 98–107)
CLARITY UR: ABNORMAL
CLUE CELLS SPEC QL WET PREP: ABNORMAL
CO2 SERPL-SCNC: 21.7 MMOL/L (ref 22–29)
COLOR UR: YELLOW
CREAT SERPL-MCNC: 0.5 MG/DL (ref 0.57–1)
DEPRECATED RDW RBC AUTO: 47.1 FL (ref 37–54)
EGFRCR SERPLBLD CKD-EPI 2021: 135.3 ML/MIN/1.73
EOSINOPHIL # BLD AUTO: 0.06 10*3/MM3 (ref 0–0.4)
EOSINOPHIL NFR BLD AUTO: 0.6 % (ref 0.3–6.2)
ERYTHROCYTE [DISTWIDTH] IN BLOOD BY AUTOMATED COUNT: 14.7 % (ref 12.3–15.4)
GLOBULIN UR ELPH-MCNC: 2.4 GM/DL
GLUCOSE BLDC GLUCOMTR-MCNC: 81 MG/DL (ref 70–105)
GLUCOSE SERPL-MCNC: 82 MG/DL (ref 65–99)
GLUCOSE UR STRIP-MCNC: NEGATIVE MG/DL
HCG INTACT+B SERPL-ACNC: NORMAL MIU/ML
HCT VFR BLD AUTO: 30.8 % (ref 34–46.6)
HGB BLD-MCNC: 10.3 G/DL (ref 12–15.9)
HGB UR QL STRIP.AUTO: NEGATIVE
HYALINE CASTS UR QL AUTO: ABNORMAL /LPF
HYDATID CYST SPEC WET PREP: ABNORMAL
IMM GRANULOCYTES # BLD AUTO: 0.04 10*3/MM3 (ref 0–0.05)
IMM GRANULOCYTES NFR BLD AUTO: 0.4 % (ref 0–0.5)
KETONES UR QL STRIP: NEGATIVE
LEUKOCYTE ESTERASE UR QL STRIP.AUTO: NEGATIVE
LIPASE SERPL-CCNC: 23 U/L (ref 13–60)
LYMPHOCYTES # BLD AUTO: 2.02 10*3/MM3 (ref 0.7–3.1)
LYMPHOCYTES NFR BLD AUTO: 21.5 % (ref 19.6–45.3)
MCH RBC QN AUTO: 29 PG (ref 26.6–33)
MCHC RBC AUTO-ENTMCNC: 33.4 G/DL (ref 31.5–35.7)
MCV RBC AUTO: 86.8 FL (ref 79–97)
MONOCYTES # BLD AUTO: 0.44 10*3/MM3 (ref 0.1–0.9)
MONOCYTES NFR BLD AUTO: 4.7 % (ref 5–12)
N GONORRHOEA RRNA SPEC QL NAA+PROBE: NOT DETECTED
NEUTROPHILS NFR BLD AUTO: 6.82 10*3/MM3 (ref 1.7–7)
NEUTROPHILS NFR BLD AUTO: 72.6 % (ref 42.7–76)
NITRITE UR QL STRIP: NEGATIVE
NRBC BLD AUTO-RTO: 0 /100 WBC (ref 0–0.2)
NUMBER OF DOSES: NORMAL
PH UR STRIP.AUTO: 8 [PH] (ref 5–8)
PLATELET # BLD AUTO: 213 10*3/MM3 (ref 140–450)
PMV BLD AUTO: 10 FL (ref 6–12)
POTASSIUM SERPL-SCNC: 3.8 MMOL/L (ref 3.5–5.2)
PROT SERPL-MCNC: 6.3 G/DL (ref 6–8.5)
PROT UR QL STRIP: ABNORMAL
RBC # BLD AUTO: 3.55 10*6/MM3 (ref 3.77–5.28)
RBC # UR STRIP: ABNORMAL /HPF
REF LAB TEST METHOD: ABNORMAL
RH BLD: POSITIVE
SODIUM SERPL-SCNC: 136 MMOL/L (ref 136–145)
SP GR UR STRIP: 1.02 (ref 1–1.03)
SQUAMOUS #/AREA URNS HPF: ABNORMAL /HPF
T VAGINALIS SPEC QL WET PREP: ABNORMAL
UROBILINOGEN UR QL STRIP: ABNORMAL
WBC # UR STRIP: ABNORMAL /HPF
WBC NRBC COR # BLD AUTO: 9.4 10*3/MM3 (ref 3.4–10.8)
WBC SPEC QL WET PREP: ABNORMAL
YEAST GENITAL QL WET PREP: ABNORMAL

## 2024-07-31 PROCEDURE — 87491 CHLMYD TRACH DNA AMP PROBE: CPT | Performed by: EMERGENCY MEDICINE

## 2024-07-31 PROCEDURE — 25810000003 SODIUM CHLORIDE 0.9 % SOLUTION: Performed by: EMERGENCY MEDICINE

## 2024-07-31 PROCEDURE — 85025 COMPLETE CBC W/AUTO DIFF WBC: CPT | Performed by: EMERGENCY MEDICINE

## 2024-07-31 PROCEDURE — 86900 BLOOD TYPING SEROLOGIC ABO: CPT | Performed by: EMERGENCY MEDICINE

## 2024-07-31 PROCEDURE — 76805 OB US >/= 14 WKS SNGL FETUS: CPT

## 2024-07-31 PROCEDURE — 84702 CHORIONIC GONADOTROPIN TEST: CPT | Performed by: EMERGENCY MEDICINE

## 2024-07-31 PROCEDURE — 82948 REAGENT STRIP/BLOOD GLUCOSE: CPT

## 2024-07-31 PROCEDURE — 99285 EMERGENCY DEPT VISIT HI MDM: CPT

## 2024-07-31 PROCEDURE — 76817 TRANSVAGINAL US OBSTETRIC: CPT

## 2024-07-31 PROCEDURE — 87591 N.GONORRHOEAE DNA AMP PROB: CPT | Performed by: EMERGENCY MEDICINE

## 2024-07-31 PROCEDURE — 86901 BLOOD TYPING SEROLOGIC RH(D): CPT | Performed by: EMERGENCY MEDICINE

## 2024-07-31 PROCEDURE — 74181 MRI ABDOMEN W/O CONTRAST: CPT

## 2024-07-31 PROCEDURE — 80053 COMPREHEN METABOLIC PANEL: CPT | Performed by: EMERGENCY MEDICINE

## 2024-07-31 PROCEDURE — 87210 SMEAR WET MOUNT SALINE/INK: CPT | Performed by: EMERGENCY MEDICINE

## 2024-07-31 PROCEDURE — 81001 URINALYSIS AUTO W/SCOPE: CPT | Performed by: EMERGENCY MEDICINE

## 2024-07-31 PROCEDURE — 96360 HYDRATION IV INFUSION INIT: CPT

## 2024-07-31 PROCEDURE — 83690 ASSAY OF LIPASE: CPT | Performed by: EMERGENCY MEDICINE

## 2024-07-31 RX ORDER — SODIUM CHLORIDE 0.9 % (FLUSH) 0.9 %
10 SYRINGE (ML) INJECTION AS NEEDED
Status: DISCONTINUED | OUTPATIENT
Start: 2024-07-31 | End: 2024-08-01 | Stop reason: HOSPADM

## 2024-07-31 RX ORDER — ACETAMINOPHEN 500 MG
1000 TABLET ORAL ONCE
Status: COMPLETED | OUTPATIENT
Start: 2024-07-31 | End: 2024-07-31

## 2024-07-31 RX ADMIN — ACETAMINOPHEN 1000 MG: 500 TABLET, FILM COATED ORAL at 17:38

## 2024-07-31 RX ADMIN — SODIUM CHLORIDE 1000 ML: 9 INJECTION, SOLUTION INTRAVENOUS at 17:22

## 2024-07-31 NOTE — Clinical Note
Level of Care: Med/Surg [1]   Admitting Physician: MYRON SAXENA [[id number]]   Attending Physician: MYRON SAXENA [[id number]]   Bed Request Comments: maternal floor   Message left for patient normal mammogram, to call with any questions.

## 2024-07-31 NOTE — ED PROVIDER NOTES
Subjective   History of Present Illness  Chief complaint: Patient is a 24-year-old.  She is G7, P2 with last menstrual period .  She is presents with some cramping and spotting.  She states since  she has had intermittent amounts of vaginal spotting.  She has a low intense cramping.  Is been worse over the last 12 hours.  She had some low back pain with it.  States this feels similar to her last pregnancy that she had a miscarriage in the second trimester last October.  She has not had fever.  She has had some discharge as well.  No loss of clear fluid.  She sees woman care with Ashley Damian and will deliver at [place].    Context:    Duration:    Timing:    Severity:    Associated Symptoms:        PCP:  LMP:      Review of Systems   Respiratory: Negative.     Cardiovascular: Negative.    Gastrointestinal:  Positive for abdominal pain.   Genitourinary:  Positive for pelvic pain and vaginal bleeding.       Past Medical History:   Diagnosis Date    Asthma     Depression     Eating disorder     Headache     Miscarriage 08/19/2019    X2       Allergies   Allergen Reactions    Buspar [Buspirone] Irritability       Past Surgical History:   Procedure Laterality Date    DENTAL PROCEDURE         Family History   Problem Relation Age of Onset    Diabetes Maternal Grandfather        Social History     Socioeconomic History    Marital status: Legally    Tobacco Use    Smoking status: Former     Types: Electronic Cigarette     Quit date: 2019     Years since quittin.0    Smokeless tobacco: Never    Tobacco comments:      vaped in the past but no longer   Substance and Sexual Activity    Alcohol use: No    Drug use: No    Sexual activity: Not Currently     Partners: Male     Birth control/protection: None           Objective   Physical Exam  Vitals and nursing note reviewed. Exam conducted with a chaperone present (Cyndy OLEA).   Constitutional:       Appearance: Normal appearance.   HENT:       Head: Normocephalic and atraumatic.   Cardiovascular:      Rate and Rhythm: Normal rate and regular rhythm.   Pulmonary:      Effort: Pulmonary effort is normal.      Breath sounds: Normal breath sounds.   Abdominal:      Tenderness: There is abdominal tenderness. There is no guarding or rebound.       Genitourinary:     Comments: Normal exam.  Cervix closed.  Whitish discharge  Musculoskeletal:         General: No swelling.   Skin:     General: Skin is warm and dry.   Neurological:      Mental Status: She is alert.   Psychiatric:         Mood and Affect: Mood normal.         Thought Content: Thought content normal.         Procedures           ED Course  ED Course as of 07/31/24 2332 Wed Jul 31, 2024 2156 Reevaluation patient still with significant pain.  Pain in right lower quadrant.  Will obtain MRI to assess for appendicitis. [LH]   2311 DR Borrero reviewed the workup and nothing from OB is a primary cause of pain.  Recommends hospitalist admit and they will consult.   [LH]   2324 Patient with persisting rlq pain will place in hospital for serial exams and surgical consultations as mri was not able to visualize appendix and had motion.   [LH]   2324 Awaiting ob and hospitalist to decide who will admit patient [LH]      ED Course User Index  [LH] Varun Castro,                                         Results for orders placed or performed during the hospital encounter of 07/31/24   Chlamydia trachomatis, Neisseria gonorrhoeae, PCR - Swab, Urine, Clean Catch    Specimen: Urine, Clean Catch; Swab   Result Value Ref Range    Chlamydia DNA by PCR Not Detected Not Detected, Invalid    Neisseria gonorrhoeae by PCR Not Detected Not Detected   Wet Prep, Genital - Swab, Vagina    Specimen: Vagina; Swab   Result Value Ref Range    YEAST No yeast seen No yeast seen    HYPHAL ELEMENTS No Hyphal elements seen No Hyphal elements seen    WBC'S 1+ WBC's seen (A) No WBC's seen    Clue Cells, Wet Prep No Clue cells  seen No Clue cells seen    Trichomonas, Wet Prep No Trichomonas seen No Trichomonas seen   Comprehensive Metabolic Panel    Specimen: Blood   Result Value Ref Range    Glucose 82 65 - 99 mg/dL    BUN 7 6 - 20 mg/dL    Creatinine 0.50 (L) 0.57 - 1.00 mg/dL    Sodium 136 136 - 145 mmol/L    Potassium 3.8 3.5 - 5.2 mmol/L    Chloride 105 98 - 107 mmol/L    CO2 21.7 (L) 22.0 - 29.0 mmol/L    Calcium 8.8 8.6 - 10.5 mg/dL    Total Protein 6.3 6.0 - 8.5 g/dL    Albumin 3.9 3.5 - 5.2 g/dL    ALT (SGPT) 6 1 - 33 U/L    AST (SGOT) 14 1 - 32 U/L    Alkaline Phosphatase 51 39 - 117 U/L    Total Bilirubin 0.2 0.0 - 1.2 mg/dL    Globulin 2.4 gm/dL    A/G Ratio 1.6 g/dL    BUN/Creatinine Ratio 14.0 7.0 - 25.0    Anion Gap 9.3 5.0 - 15.0 mmol/L    eGFR 135.3 >60.0 mL/min/1.73   Urinalysis With Microscopic If Indicated (No Culture) - Urine, Clean Catch    Specimen: Urine, Clean Catch   Result Value Ref Range    Color, UA Yellow Yellow, Straw    Appearance, UA Slightly Cloudy (A) Clear    pH, UA 8.0 5.0 - 8.0    Specific Gravity, UA 1.023 1.005 - 1.030    Glucose, UA Negative Negative    Ketones, UA Negative Negative    Bilirubin, UA Negative Negative    Blood, UA Negative Negative    Protein, UA 30 mg/dL (1+) (A) Negative    Leuk Esterase, UA Negative Negative    Nitrite, UA Negative Negative    Urobilinogen, UA 1.0 E.U./dL 0.2 - 1.0 E.U./dL   Lipase    Specimen: Blood   Result Value Ref Range    Lipase 23 13 - 60 U/L   hCG, Quantitative, Pregnancy    Specimen: Blood   Result Value Ref Range    HCG Quantitative 11,888.00 mIU/mL   CBC Auto Differential    Specimen: Blood   Result Value Ref Range    WBC 9.40 3.40 - 10.80 10*3/mm3    RBC 3.55 (L) 3.77 - 5.28 10*6/mm3    Hemoglobin 10.3 (L) 12.0 - 15.9 g/dL    Hematocrit 30.8 (L) 34.0 - 46.6 %    MCV 86.8 79.0 - 97.0 fL    MCH 29.0 26.6 - 33.0 pg    MCHC 33.4 31.5 - 35.7 g/dL    RDW 14.7 12.3 - 15.4 %    RDW-SD 47.1 37.0 - 54.0 fl    MPV 10.0 6.0 - 12.0 fL    Platelets 213 140 - 450  10*3/mm3    Neutrophil % 72.6 42.7 - 76.0 %    Lymphocyte % 21.5 19.6 - 45.3 %    Monocyte % 4.7 (L) 5.0 - 12.0 %    Eosinophil % 0.6 0.3 - 6.2 %    Basophil % 0.2 0.0 - 1.5 %    Immature Grans % 0.4 0.0 - 0.5 %    Neutrophils, Absolute 6.82 1.70 - 7.00 10*3/mm3    Lymphocytes, Absolute 2.02 0.70 - 3.10 10*3/mm3    Monocytes, Absolute 0.44 0.10 - 0.90 10*3/mm3    Eosinophils, Absolute 0.06 0.00 - 0.40 10*3/mm3    Basophils, Absolute 0.02 0.00 - 0.20 10*3/mm3    Immature Grans, Absolute 0.04 0.00 - 0.05 10*3/mm3    nRBC 0.0 0.0 - 0.2 /100 WBC   Urinalysis, Microscopic Only - Urine, Clean Catch    Specimen: Urine, Clean Catch   Result Value Ref Range    RBC, UA 0-2 None Seen, 0-2 /HPF    WBC, UA 3-5 (A) None Seen, 0-2 /HPF    Bacteria, UA None Seen None Seen /HPF    Squamous Epithelial Cells, UA 0-2 None Seen, 0-2 /HPF    Hyaline Casts, UA None Seen None Seen /LPF    Methodology Automated Microscopy    POC Glucose Once    Specimen: Blood   Result Value Ref Range    Glucose 81 70 - 105 mg/dL    RhIg Evaluation    Specimen: Blood   Result Value Ref Range    ABO Type A     RH type Positive    Doses of Rh Immune Globulin    Specimen: Blood   Result Value Ref Range    Number of Doses       RhIg is not indicated due to the patient's Rh status        MRI Abdomen Without Contrast    Result Date: 2024  Impression: Study is severely degraded by motion artifact. Within technical limitation: No convincing acute intra-abdominal abnormality. No convincing biliary or pancreatic ductal dilatation. Electronically Signed: Jacob Davis  2024 10:13 PM EDT  Workstation ID: [id number]     Ob Transvaginal    Result Date: 2024  Impression: Single live intrauterine pregnancy with biometric measurements corresponding to 17 weeks 1 days, which is concordant with clinical age. Electronically Signed: Jacob Davis  2024 8:02 PM EDT  Workstation ID: [id number]    US Ob 14 + Weeks Single or First Gestation    Result  Date: 7/31/2024  Impression: Single live intrauterine pregnancy with biometric measurements corresponding to 17 weeks 1 days, which is concordant with clinical age.  Electronically Signed: Jacob Davis  7/31/2024 7:37 PM EDT  Workstation ID: [id number]       Medical Decision Making  Patient was seen and evaluated for the above problem    Differential diagnosis includes but is not limited to miscarriage, inevitable miscarriage, fetal demise, appendicitis, ureterolithiasis, UTI    Patient with persisting right lower quadrant pain.  She had a negative OB/GYN workup.  Trying to rule out appendicitis.  MRI was equivocal.  She is with persisting symptoms.  I spoke with the hospitalist, Dr. Tran, Who declined admission because the patient was pregnant.  I spoke with Dr. Borrero who evaluated the patient and did agree to admit to the hospital.      Problems Addressed:  Abdominal pain during pregnancy in second trimester: complicated acute illness or injury    Amount and/or Complexity of Data Reviewed  Labs: ordered. Decision-making details documented in ED Course.     Details: Labs reviewed by myself  Radiology: ordered and independent interpretation performed.     Details: Ultrasound and MRI reviewed by myself as above    Risk  OTC drugs.  Prescription drug management.  Decision regarding hospitalization.        Final diagnoses:   None     Abdominal pain in pregnancy  ED Disposition  ED Disposition       None            No follow-up provider specified.       Medication List      No changes were made to your prescriptions during this visit.            Varun Castro DO  07/31/24 5899

## 2024-08-01 VITALS
HEIGHT: 67 IN | HEART RATE: 86 BPM | OXYGEN SATURATION: 100 % | DIASTOLIC BLOOD PRESSURE: 68 MMHG | TEMPERATURE: 97.8 F | BODY MASS INDEX: 20.17 KG/M2 | WEIGHT: 128.53 LBS | RESPIRATION RATE: 18 BRPM | SYSTOLIC BLOOD PRESSURE: 113 MMHG

## 2024-08-01 PROBLEM — O26.899 ABDOMINAL PAIN COMPLICATING PREGNANCY: Status: ACTIVE | Noted: 2024-08-01

## 2024-08-01 PROBLEM — O26.899 ABDOMINAL PAIN COMPLICATING PREGNANCY: Status: RESOLVED | Noted: 2024-08-01 | Resolved: 2024-08-01

## 2024-08-01 PROBLEM — R10.9 ABDOMINAL PAIN COMPLICATING PREGNANCY: Status: ACTIVE | Noted: 2024-08-01

## 2024-08-01 PROBLEM — R10.9 ABDOMINAL PAIN COMPLICATING PREGNANCY: Status: RESOLVED | Noted: 2024-08-01 | Resolved: 2024-08-01

## 2024-08-01 LAB
BASOPHILS # BLD AUTO: 0.01 10*3/MM3 (ref 0–0.2)
BASOPHILS NFR BLD AUTO: 0.2 % (ref 0–1.5)
DEPRECATED RDW RBC AUTO: 47.6 FL (ref 37–54)
EOSINOPHIL # BLD AUTO: 0.1 10*3/MM3 (ref 0–0.4)
EOSINOPHIL NFR BLD AUTO: 1.7 % (ref 0.3–6.2)
ERYTHROCYTE [DISTWIDTH] IN BLOOD BY AUTOMATED COUNT: 14.7 % (ref 12.3–15.4)
HCT VFR BLD AUTO: 31.8 % (ref 34–46.6)
HGB BLD-MCNC: 10.1 G/DL (ref 12–15.9)
IMM GRANULOCYTES # BLD AUTO: 0.01 10*3/MM3 (ref 0–0.05)
IMM GRANULOCYTES NFR BLD AUTO: 0.2 % (ref 0–0.5)
LYMPHOCYTES # BLD AUTO: 1.76 10*3/MM3 (ref 0.7–3.1)
LYMPHOCYTES NFR BLD AUTO: 29.6 % (ref 19.6–45.3)
MCH RBC QN AUTO: 28.1 PG (ref 26.6–33)
MCHC RBC AUTO-ENTMCNC: 31.8 G/DL (ref 31.5–35.7)
MCV RBC AUTO: 88.3 FL (ref 79–97)
MONOCYTES # BLD AUTO: 0.28 10*3/MM3 (ref 0.1–0.9)
MONOCYTES NFR BLD AUTO: 4.7 % (ref 5–12)
NEUTROPHILS NFR BLD AUTO: 3.78 10*3/MM3 (ref 1.7–7)
NEUTROPHILS NFR BLD AUTO: 63.6 % (ref 42.7–76)
NRBC BLD AUTO-RTO: 0 /100 WBC (ref 0–0.2)
PLATELET # BLD AUTO: 186 10*3/MM3 (ref 140–450)
PMV BLD AUTO: 10.2 FL (ref 6–12)
RBC # BLD AUTO: 3.6 10*6/MM3 (ref 3.77–5.28)
WBC NRBC COR # BLD AUTO: 5.94 10*3/MM3 (ref 3.4–10.8)

## 2024-08-01 PROCEDURE — G0378 HOSPITAL OBSERVATION PER HR: HCPCS

## 2024-08-01 PROCEDURE — 63710000001 ONDANSETRON ODT 4 MG TABLET DISPERSIBLE: Performed by: OBSTETRICS & GYNECOLOGY

## 2024-08-01 PROCEDURE — 85025 COMPLETE CBC W/AUTO DIFF WBC: CPT | Performed by: OBSTETRICS & GYNECOLOGY

## 2024-08-01 RX ORDER — HYDROXYZINE HYDROCHLORIDE 25 MG/1
50 TABLET, FILM COATED ORAL 3 TIMES DAILY PRN
Status: DISCONTINUED | OUTPATIENT
Start: 2024-08-01 | End: 2024-08-01 | Stop reason: HOSPADM

## 2024-08-01 RX ORDER — CYCLOBENZAPRINE HCL 10 MG
10 TABLET ORAL 3 TIMES DAILY PRN
Status: DISCONTINUED | OUTPATIENT
Start: 2024-08-01 | End: 2024-08-01 | Stop reason: HOSPADM

## 2024-08-01 RX ORDER — ONDANSETRON 4 MG/1
4 TABLET, ORALLY DISINTEGRATING ORAL EVERY 6 HOURS PRN
Status: DISCONTINUED | OUTPATIENT
Start: 2024-08-01 | End: 2024-08-01 | Stop reason: HOSPADM

## 2024-08-01 RX ORDER — ONDANSETRON 4 MG/1
4 TABLET, ORALLY DISINTEGRATING ORAL EVERY 6 HOURS PRN
Qty: 20 TABLET | Refills: 0 | Status: SHIPPED | OUTPATIENT
Start: 2024-08-01

## 2024-08-01 RX ORDER — CYCLOBENZAPRINE HCL 10 MG
10 TABLET ORAL 3 TIMES DAILY PRN
Qty: 10 TABLET | Refills: 0 | Status: SHIPPED | OUTPATIENT
Start: 2024-08-01

## 2024-08-01 RX ADMIN — HYDROXYZINE HYDROCHLORIDE 50 MG: 25 TABLET ORAL at 01:56

## 2024-08-01 RX ADMIN — ONDANSETRON 4 MG: 4 TABLET, ORALLY DISINTEGRATING ORAL at 01:48

## 2024-08-01 RX ADMIN — CYCLOBENZAPRINE 10 MG: 10 TABLET, FILM COATED ORAL at 01:48

## 2024-08-01 NOTE — ED NOTES
Nursing report ED to floor  Mouna Manley  24 y.o.  female    HPI:   Chief Complaint   Patient presents with    Vaginal Bleeding - Pregnant     Started Monday night, abd and back pain, pt is 17 weeks       Admitting doctor:   Elma Borrero MD    Admitting diagnosis:   The encounter diagnosis was Abdominal pain during pregnancy in second trimester.    Code status:   Current Code Status       Date Active Code Status Order ID Comments User Context       Not on file            Allergies:   Buspar [buspirone]    Isolation:  No active isolations     Fall Risk:  Fall Risk Assessment was completed, and patient is at low risk for falls.   Predictive Model Details         1 (Low) Factor Value    Calculated 8/1/2024 00:17 Active Peripheral IV Present    Risk of Fall Model Imaging order in this encounter Present     Magnesium not on file     Diastolic BP 69     Age 24     Rajeev Scale not on file     Calcium 8.8 mg/dL     Number of Distinct Medication Classes administered 2     Creatinine 0.5 mg/dL     Albumin 3.9 g/dL     Days after Admission 0.348     Respiratory Rate 16     ALT 6 U/L     Tobacco Use Quit     Chloride 105 mmol/L     Total Bilirubin 0.2 mg/dL     Potassium 3.8 mmol/L         Weight:       07/31/24  1555   Weight: 58.3 kg (128 lb 8.5 oz)       Intake and Output  No intake or output data in the 24 hours ending 08/01/24 0017    Diet:        Most recent vitals:   Vitals:    07/31/24 2008 07/31/24 2020 07/31/24 2143 07/31/24 2145   BP:   97/61 98/69   Pulse: 85 83  79   Resp:       Temp:       TempSrc:       SpO2: 99% 99%  100%   Weight:       Height:           Active LDAs/IV Access:   Lines, Drains & Airways       Active LDAs       Name Placement date Placement time Site Days    Peripheral IV 07/31/24 1649 Right Antecubital 07/31/24  1649  Antecubital  less than 1                    Skin Condition:   Skin Assessments (last day)       None             Labs (abnormal labs have a star):   Labs Reviewed   WET PREP,  GENITAL - Abnormal; Notable for the following components:       Result Value    WBC'S 1+ WBC's seen (*)     All other components within normal limits   COMPREHENSIVE METABOLIC PANEL - Abnormal; Notable for the following components:    Creatinine 0.50 (*)     CO2 21.7 (*)     All other components within normal limits    Narrative:     GFR Normal >60  Chronic Kidney Disease <60  Kidney Failure <15     URINALYSIS W/ MICROSCOPIC IF INDICATED (NO CULTURE) - Abnormal; Notable for the following components:    Appearance, UA Slightly Cloudy (*)     Protein, UA 30 mg/dL (1+) (*)     All other components within normal limits   CBC WITH AUTO DIFFERENTIAL - Abnormal; Notable for the following components:    RBC 3.55 (*)     Hemoglobin 10.3 (*)     Hematocrit 30.8 (*)     Monocyte % 4.7 (*)     All other components within normal limits   URINALYSIS, MICROSCOPIC ONLY - Abnormal; Notable for the following components:    WBC, UA 3-5 (*)     All other components within normal limits   CHLAMYDIA TRACHOMATIS, NEISSERIA GONORRHOEAE, PCR - Normal   LIPASE - Normal   POCT GLUCOSE FINGERSTICK - Normal   HCG, QUANTITATIVE, PREGNANCY    Narrative:     HCG Ranges by Gestational Age    Females - non-pregnant premenopausal   </= 1mIU/mL HCG  Females - postmenopausal               </= 7mIU/mL HCG    3 Weeks       5.4   -      72 mIU/mL  4 Weeks      10.2   -     708 mIU/mL  5 Weeks       217   -   8,245 mIU/mL  6 Weeks       152   -  32,177 mIU/mL  7 Weeks     4,059   - 153,767 mIU/mL  8 Weeks    31,366   - 149,094 mIU/mL  9 Weeks    59,109   - 135,901 mIU/mL  10 Weeks   44,186   - 170,409 mIU/mL  12 Weeks   27,107   - 201,615 mIU/mL  14 Weeks   24,302   -  93,646 mIU/mL  15 Weeks   12,540   -  69,747 mIU/mL  16 Weeks    8,904   -  55,332 mIU/mL  17 Weeks    8,240   -  51,793 mIU/mL  18 Weeks    9,649   -  55,271 mIU/mL      RHIG EVALUATION   DOSES OF RH IMMUNE GLOBULIN   CBC AND DIFFERENTIAL    Narrative:     The following orders were  created for panel order CBC & Differential.  Procedure                               Abnormality         Status                     ---------                               -----------         ------                     CBC Auto Differential[[id number]]        Abnormal            Final result                 Please view results for these tests on the individual orders.       LOC: Person, Place, Time, and Situation    Telemetry:  Med/Surg    Cardiac Monitoring Ordered: yes    EKG:   No orders to display       Medications Given in the ED:   Medications   sodium chloride 0.9 % flush 10 mL (has no administration in time range)   sodium chloride 0.9 % bolus 1,000 mL (0 mL Intravenous Stopped 7/31/24 2144)   acetaminophen (TYLENOL) tablet 1,000 mg (1,000 mg Oral Given 7/31/24 1738)       Imaging results:  MRI Abdomen Without Contrast    Result Date: 7/31/2024  Impression: Study is severely degraded by motion artifact. Within technical limitation: No convincing acute intra-abdominal abnormality. No convincing biliary or pancreatic ductal dilatation. Electronically Signed: Jacob Davis  7/31/2024 10:13 PM EDT  Workstation ID: [id number]    US Ob Transvaginal    Result Date: 7/31/2024  Impression: Single live intrauterine pregnancy with biometric measurements corresponding to 17 weeks 1 days, which is concordant with clinical age. Electronically Signed: Jacob Davis  7/31/2024 8:02 PM EDT  Workstation ID: [id number]    US Ob 14 + Weeks Single or First Gestation    Result Date: 7/31/2024  Impression: Single live intrauterine pregnancy with biometric measurements corresponding to 17 weeks 1 days, which is concordant with clinical age.  Electronically Signed: Jacob Davis  7/31/2024 7:37 PM EDT  Workstation ID: [id number]     Social issues:   Social History     Socioeconomic History    Marital status: Legally    Tobacco Use    Smoking status: Former     Types: Electronic Cigarette     Quit date: 7/1/2019     Years  since quittin.0    Smokeless tobacco: Never    Tobacco comments:      vaped in the past but no longer   Substance and Sexual Activity    Alcohol use: No    Drug use: No    Sexual activity: Not Currently     Partners: Male     Birth control/protection: None       NIH Stroke Scale:  Interval: (not recorded)  1a. Level of Consciousness: (not recorded)  1b. LOC Questions: (not recorded)  1c. LOC Commands: (not recorded)  2. Best Gaze: (not recorded)  3. Visual: (not recorded)  4. Facial Palsy: (not recorded)  5a. Motor Arm, Left: (not recorded)  5b. Motor Arm, Right: (not recorded)  6a. Motor Leg, Left: (not recorded)  6b. Motor Leg, Right: (not recorded)  7. Limb Ataxia: (not recorded)  8. Sensory: (not recorded)  9. Best Language: (not recorded)  10. Dysarthria: (not recorded)  11. Extinction and Inattention (formerly Neglect): (not recorded)    Total (NIH Stroke Scale): (not recorded)     Additional notable assessment information:    Nursing report ED to floor:  Saray Shah RN   24 00:17 EDT

## 2024-08-01 NOTE — SIGNIFICANT NOTE
Case Management Discharge Note                Selected Continued Care - Discharged on 8/1/2024 Admission date: 7/31/2024 - Discharge disposition: Home or Self Care              Transportation Services  Private: Car    Final Discharge Disposition Code: (P) 01 - home or self-care

## 2024-08-01 NOTE — NURSING NOTE
Fetal heart tones 145, infant noted to be active. Pt denies leaking of fluid, contractions, and bleeding at this time. States cramps have improved.

## 2024-08-01 NOTE — DISCHARGE SUMMARY
Date of Discharge:  2024    Discharge Diagnosis: 17wk pregnant, abdominal pain, back pain x3 days    Presenting Problem/History of Present Illness:  Active Hospital Problems   No active problems to display.      Resolved Hospital Problems    Diagnosis Date Resolved POA    **Abdominal pain complicating pregnancy [O26.899, R10.9] 2024 Unknown          Hospital Course:  Patient is a 24 y.o. female  at 17 wks gestation who presented to ER w/ complaint of constant lower abdominal pain and bilateral low back pain for 3 days.  Movement makes the pain worse.  Pt denies any VB.   She denies emesis but states she has had persistent nausea t/o the pregnancy.  She denies dysuria, fever, chills, diarrhea.  She is not yet feeling fetal movement.  She receives her prenatal care at [facility].  She has been seen at this ER twice for syncope and N/V.  Her preg is c/b recurrent SABs, first trimester bleeding associated w/ MANJIT, anemia, anxiety and depression.   The ER physician had some concern for appendicitis and MRI was ordered in which they were not able to visualize the appendix. Pt states abdominal pain and back pain improved with flexeril. States helped her sleep. Denies n/v this am. Stable for d/c. Has appt with [facility] 2024 for her anatomy scan.   Procedures Performed:         Consults:   Consults       Date and Time Order Name Status Description    2024 10:46 PM OB/GYN (on-call MD unless specified)      2024 10:23 PM Hospitalist (on-call MD unless specified)              Pertinent Test Results:   Lab Results (last 72 hours)       Procedure Component Value Units Date/Time    CBC & Differential [[id number]]  (Abnormal) Collected: 24    Specimen: Blood from Arm, Right Updated: 24    Narrative:      The following orders were created for panel order CBC & Differential.  Procedure                               Abnormality         Status                     ---------                                -----------         ------                     CBC Auto Differential[[id number]]        Abnormal            Final result                 Please view results for these tests on the individual orders.    CBC Auto Differential [[id number]]  (Abnormal) Collected: 08/01/24 0606    Specimen: Blood from Arm, Right Updated: 08/01/24 0617     WBC 5.94 10*3/mm3      RBC 3.60 10*6/mm3      Hemoglobin 10.1 g/dL      Hematocrit 31.8 %      MCV 88.3 fL      MCH 28.1 pg      MCHC 31.8 g/dL      RDW 14.7 %      RDW-SD 47.6 fl      MPV 10.2 fL      Platelets 186 10*3/mm3      Neutrophil % 63.6 %      Lymphocyte % 29.6 %      Monocyte % 4.7 %      Eosinophil % 1.7 %      Basophil % 0.2 %      Immature Grans % 0.2 %      Neutrophils, Absolute 3.78 10*3/mm3      Lymphocytes, Absolute 1.76 10*3/mm3      Monocytes, Absolute 0.28 10*3/mm3      Eosinophils, Absolute 0.10 10*3/mm3      Basophils, Absolute 0.01 10*3/mm3      Immature Grans, Absolute 0.01 10*3/mm3      nRBC 0.0 /100 WBC     POC Glucose Once [[id number]]  (Normal) Collected: 07/31/24 2141    Specimen: Blood Updated: 07/31/24 2143     Glucose 81 mg/dL      Comment: Serial Number: [id number]:  [id number]       Chlamydia trachomatis, Neisseria gonorrhoeae, PCR - Swab, Urine, Clean Catch [[id number]]  (Normal) Collected: 07/31/24 1722    Specimen: Swab from Urine, Clean Catch Updated: 07/31/24 1911     Chlamydia DNA by PCR Not Detected     Neisseria gonorrhoeae by PCR Not Detected    hCG, Quantitative, Pregnancy [[id number]] Collected: 07/31/24 1649    Specimen: Blood Updated: 07/31/24 1745     HCG Quantitative 11,888.00 mIU/mL     Narrative:      HCG Ranges by Gestational Age    Females - non-pregnant premenopausal   </= 1mIU/mL HCG  Females - postmenopausal               </= 7mIU/mL HCG    3 Weeks       5.4   -      72 mIU/mL  4 Weeks      10.2   -     708 mIU/mL  5 Weeks       217   -   8,245 mIU/mL  6 Weeks       152   -  32,177 mIU/mL  7 Weeks     4,059    - 153,767 mIU/mL  8 Weeks    31,366   - 149,094 mIU/mL  9 Weeks    59,109   - 135,901 mIU/mL  10 Weeks   44,186   - 170,409 mIU/mL  12 Weeks   27,107   - 201,615 mIU/mL  14 Weeks   24,302   -  93,646 mIU/mL  15 Weeks   12,540   -  69,747 mIU/mL  16 Weeks    8,904   -  55,332 mIU/mL  17 Weeks    8,240   -  51,793 mIU/mL  18 Weeks    9,649   -  55,271 mIU/mL      Wet Prep, Genital - Swab, Vagina [[id number]]  (Abnormal) Collected: 07/31/24 1727    Specimen: Swab from Vagina Updated: 07/31/24 1738     YEAST No yeast seen     HYPHAL ELEMENTS No Hyphal elements seen     WBC'S 1+ WBC's seen     Clue Cells, Wet Prep No Clue cells seen     Trichomonas, Wet Prep No Trichomonas seen    Urinalysis, Microscopic Only - Urine, Clean Catch [[id number]]  (Abnormal) Collected: 07/31/24 1721    Specimen: Urine, Clean Catch Updated: 07/31/24 1738     RBC, UA 0-2 /HPF      WBC, UA 3-5 /HPF      Bacteria, UA None Seen /HPF      Squamous Epithelial Cells, UA 0-2 /HPF      Hyaline Casts, UA None Seen /LPF      Methodology Automated Microscopy    Urinalysis With Microscopic If Indicated (No Culture) - Urine, Clean Catch [[id number]]  (Abnormal) Collected: 07/31/24 1721    Specimen: Urine, Clean Catch Updated: 07/31/24 1733     Color, UA Yellow     Appearance, UA Slightly Cloudy     Comment: Result checked          pH, UA 8.0     Specific Gravity, UA 1.023     Glucose, UA Negative     Ketones, UA Negative     Bilirubin, UA Negative     Blood, UA Negative     Protein, UA 30 mg/dL (1+)     Leuk Esterase, UA Negative     Nitrite, UA Negative     Urobilinogen, UA 1.0 E.U./dL    Comprehensive Metabolic Panel [[id number]]  (Abnormal) Collected: 07/31/24 1649    Specimen: Blood Updated: 07/31/24 1716     Glucose 82 mg/dL      BUN 7 mg/dL      Creatinine 0.50 mg/dL      Sodium 136 mmol/L      Potassium 3.8 mmol/L      Chloride 105 mmol/L      CO2 21.7 mmol/L      Calcium 8.8 mg/dL      Total Protein 6.3 g/dL      Albumin 3.9 g/dL      ALT (SGPT) 6  U/L      AST (SGOT) 14 U/L      Alkaline Phosphatase 51 U/L      Total Bilirubin 0.2 mg/dL      Globulin 2.4 gm/dL      A/G Ratio 1.6 g/dL      BUN/Creatinine Ratio 14.0     Anion Gap 9.3 mmol/L      eGFR 135.3 mL/min/1.73     Narrative:      GFR Normal >60  Chronic Kidney Disease <60  Kidney Failure <15      Lipase [[id number]]  (Normal) Collected: 07/31/24 1649    Specimen: Blood Updated: 07/31/24 1716     Lipase 23 U/L     CBC & Differential [[id number]]  (Abnormal) Collected: 07/31/24 1649    Specimen: Blood Updated: 07/31/24 1654    Narrative:      The following orders were created for panel order CBC & Differential.  Procedure                               Abnormality         Status                     ---------                               -----------         ------                     CBC Auto Differential[[id number]]        Abnormal            Final result                 Please view results for these tests on the individual orders.    CBC Auto Differential [[id number]]  (Abnormal) Collected: 07/31/24 1649    Specimen: Blood Updated: 07/31/24 1654     WBC 9.40 10*3/mm3      RBC 3.55 10*6/mm3      Hemoglobin 10.3 g/dL      Hematocrit 30.8 %      MCV 86.8 fL      MCH 29.0 pg      MCHC 33.4 g/dL      RDW 14.7 %      RDW-SD 47.1 fl      MPV 10.0 fL      Platelets 213 10*3/mm3      Neutrophil % 72.6 %      Lymphocyte % 21.5 %      Monocyte % 4.7 %      Eosinophil % 0.6 %      Basophil % 0.2 %      Immature Grans % 0.4 %      Neutrophils, Absolute 6.82 10*3/mm3      Lymphocytes, Absolute 2.02 10*3/mm3      Monocytes, Absolute 0.44 10*3/mm3      Eosinophils, Absolute 0.06 10*3/mm3      Basophils, Absolute 0.02 10*3/mm3      Immature Grans, Absolute 0.04 10*3/mm3      nRBC 0.0 /100 WBC           Imaging Results (Last 72 Hours)       Procedure Component Value Units Date/Time    MRI Abdomen Without Contrast [[id number]] Collected: 07/31/24 2151     Updated: 07/31/24 2215    Narrative:      MRI ABDOMEN WO CONTRAST    Date  of Exam: 7/31/2024 8:42 PM EDT    Indication: abdominal pain in pregnancy.     Comparison: None available.    Technique:  Routine multiplanar/multisequence images of the abdomen were obtained without contrast administration.      Findings:   Study is severely degraded by motion artifact. Within technical limitation:    Included lungs: No significant abnormality.    Liver: No significant abnormality.     Gallbladder and biliary tree: The gallbladder appears unremarkable. No convincing intrahepatic or extrahepatic biliary ductal dilatation.     Spleen: No significant abnormality.     Pancreas: No significant abnormality. No convincing pancreatic ductal dilatation.     Adrenal glands: No significant abnormality.     Kidneys and included ureters: No significant abnormality.    Stomach and duodenum: No significant abnormality.     Small and large bowel: Imaged portions appear grossly unremarkable. Appendix not definitely visualized, though it is likely not within field of view.    Peritoneal cavity: No free fluid or free air.    Vasculature: No significant abnormality.     Lymph nodes: No pathologic appearing lymph nodes by imaging criteria.     Bones and soft tissues: No significant abnormality.    Additional findings: Partially imaged gestational sac.         Impression:      Impression:    Study is severely degraded by motion artifact. Within technical limitation:    No convincing acute intra-abdominal abnormality. No convincing biliary or pancreatic ductal dilatation.    Electronically Signed: Jacob Davis    7/31/2024 10:13 PM EDT    Workstation ID: [id number]    US Ob Transvaginal [[id number]] Collected: 07/2001     Updated: 07/31/24 2004    Narrative:      US OB TRANSVAGINAL    Date of Exam: 7/31/2024 6:49 PM EDT    Indication: Spotting.    Comparison: Pelvic ultrasound 5/31/2024.    Technique: Transvaginal ultrasound was performed to evaluate pregnancy.  Real time scanning was performed with multiple image  documentation per protocol.        Findings:  Clinical age: 17 weeks, 3 days.    Biparietal diameter (BPD): 3.7 cm, corresponding to an average ultrasound age of 17 weeks, 2 days.  Head circumference (HC): 14.3 cm, corresponding to an average ultrasound age of 17 weeks, 4 days.  Abdominal circumference (AC): 11.3 cm, corresponding to an average ultrasound age of 17 weeks, 1 day.  Femur length (FL): 2.4 cm, corresponding to an average ultrasound age of 17 weeks, 2 days  Fetal heart rate: 131 beats/min.  Placenta: Posterior fundal location. There is a 7 x 6 x 5 mm anechoic region within the placenta that may represent a placental cyst; recommend attention on follow-up imaging.  Cervix: 4.5 cm and closed.  Thin subchorionic hemorrhage appears to have decreased compared to prior imaging; recommend attention on follow-up imaging.    Other: Ovaries not sonographically visible. No adnexal lesions. No free fluid.      Impression:      Impression:    Single live intrauterine pregnancy with biometric measurements corresponding to 17 weeks 1 days, which is concordant with clinical age.        Electronically Signed: Jacob Davis    7/31/2024 8:02 PM EDT    Workstation ID: [id number]    US Ob 14 + Weeks Single or First Gestation [[id number]] Collected: 07/31/24 1924     Updated: 07/31/24 1939    Narrative:      US OB 14 + WEEKS SINGLE OR FIRST GESTATION    Date of Exam: 7/31/2024 6:23 PM EDT    Indication: pelvic pain bleeding.    Comparison: Pelvic ultrasound 5/31/2024.    Technique: Grayscale and color Doppler transabdominal ultrasound was performed for maternal and fetal evaluation after the first trimester single gestation. Images were obtained per protocol.      Findings:  Clinical age: 17 weeks, 3 days.    Biparietal diameter (BPD): 3.7 cm, corresponding to an average ultrasound age of 17 weeks, 2 days.  Head circumference (HC): 14.3 cm, corresponding to an average ultrasound age of 17 weeks, 4 days.  Abdominal  circumference (AC): 11.3 cm, corresponding to an average ultrasound age of 17 weeks, 1 day.  Femur length (FL): 2.4 cm, corresponding to an average ultrasound age of 17 weeks, 2 days  Fetal heart rate: 131 beats/min.  Placenta: Posterior fundal location. There is a 7 x 6 x 5 mm anechoic region within the placenta that may represent a placental cyst; recommend attention on follow-up imaging.  Cervix: 4.5 cm and closed.  Thin subchorionic hemorrhage appears to have decreased compared to prior imaging; recommend attention on follow-up imaging.    Other: Ovaries not sonographically visible. No adnexal lesions. No free fluid.      Impression:      Impression:  Single live intrauterine pregnancy with biometric measurements corresponding to 17 weeks 1 days, which is concordant with clinical age.           Electronically Signed: Jacob Davis    7/31/2024 7:37 PM EDT    Workstation ID: [id number]            Condition on Discharge:  Good    Vital Signs:  Vitals:    07/31/24 2145 08/01/24 0120 08/01/24 0323 08/01/24 0624   BP: 98/69 112/71 110/69 102/58   BP Location:  Left arm Left arm Left arm   Patient Position:  Sitting  Lying   Pulse: 79 86 84 67   Resp:  17 17 17   Temp:  97.8 °F (36.6 °C) 97.7 °F (36.5 °C) 97.6 °F (36.4 °C)   TempSrc:  Oral Oral Oral   SpO2: 100% 99% 99% 98%   Weight:       Height:           Physical Exam:     General Appearance:    Alert, cooperative, in no acute distress   Lungs:     Clear to auscultation,respirations regular, even and                   unlabored    Heart:    Regular rhythm and normal rate.    Breast Exam:    Deferred   Abdomen:     Normal bowel sounds, no masses, no organomegaly, soft        non-tender, non-distended, no guarding, no rebound                 tenderness   Genitalia:    Deferred   Extremities:   Moves all extremities well, no edema, no cyanosis, no             redness       Discharge Disposition:  Home    Discharge Medications:     Discharge Medications        New  Medications        Instructions Start Date   cyclobenzaprine 10 MG tablet  Commonly known as: FLEXERIL   10 mg, Oral, 3 Times Daily PRN      ondansetron ODT 4 MG disintegrating tablet  Commonly known as: ZOFRAN-ODT   4 mg, Translingual, Every 6 Hours PRN               Activity at Discharge:   Activity Instructions       Activity as Tolerated              Follow-up Appointments  No future appointments.  Additional Instructions for the Follow-ups that You Need to Schedule       Call MD With Problems / Concerns   As directed      Instructions: Temperature >100.4  Bleeding >1 pad per hour  Pain not controlled by pain medications.    Order Comments: Instructions: Temperature >100.4 Bleeding >1 pad per hour Pain not controlled by pain medications.         Discharge Follow-up with [facility]; 1 Week   As directed      [facility]   Follow Up: 1 Week                Test Results Pending at Discharge       ROSA ELENA Kraft  08/01/24  09:42 EDT

## 2024-08-01 NOTE — PLAN OF CARE
Goal Outcome Evaluation:  Plan of Care Reviewed With: patient        Progress: improving               Patient came from the ED for abdominal pain complicating pregnancy. Patient is voiding appropriately. Patient has been sleeping on and off through out the night

## 2024-08-01 NOTE — DISCHARGE INSTR - APPOINTMENTS
Pt to call and schedule for a follow up OBGYN appointment for 1 week, pt still undecided on if wants to switch practices.

## 2024-08-01 NOTE — H&P
Ob-Gyn H&P    Patient Care Team:  Provider, No Known as PCP - General    Chief complaint abdominal pain and back pain at 17 wks gestation    Subjective .     History of present illness:  Pt is 23 yo  at 17 wks gestation who presents to ER w/ complaint of constant lower abdominal pain and bilateral low back pain for 3 days.  Movement makes the pain worse.  Pt denies any VB.   She denies emesis but states she has had persistent nausea t/o the pregnancy.  She denies dysuria, fever, chills, diarrhea.  She is not yet feeling fetal movement.  She receives her prenatal care at [facility].  She has been seen at this ER twice for syncope and N/V.  Her preg is c/b recurrent SABs, first trimester bleeding associated w/ MANJIT, anemia, anxiety and depression.   The ER physician had some concern for appendicitis and MRI was ordered in which they were not able to visualize the appendix.   When I evaluated the pt she was frustrated because she was not able to eat all day and she did not like the turkey sandwich they brought her.        Abdominal pain complicating pregnancy      Past Medical History:   Diagnosis Date    Asthma     Depression     Eating disorder     Headache     Miscarriage 08/19/2019    X2       Past Surgical History:   Procedure Laterality Date    DENTAL PROCEDURE         Ob Hx:  Two term vaginal deliveries  4 SABs, the most recent being 6 week SAB treated w/ D&C 10/2023    Allergies   Allergen Reactions    Buspar [Buspirone] Irritability         Objective     Vital Signs   Vitals:    24   BP:   97/61 98/69   Pulse: 85 83  79   Resp:       Temp:       TempSrc:       SpO2: 99% 99%  100%   Weight:       Height:         Temp (24hrs), Av °F (36.7 °C), Min:98 °F (36.7 °C), Max:98 °F (36.7 °C)      Physical Exam:     General Appearance:    Alert, cooperative, in no acute distress   Abdomen:     Normal bowel sounds, no masses, no organomegaly, soft         Abdomen mildly tender bilateral L>R, non-distended, no guarding, no rebound                 tenderness   Genitalia:    Cervix closed and thick per ER physician     Lab Results:  Lab Results (last 48 hours)       Procedure Component Value Units Date/Time    POC Glucose Once [[id number]]  (Normal) Collected: 07/31/24 2141    Specimen: Blood Updated: 07/31/24 2143     Glucose 81 mg/dL      Comment: Serial Number: [id number]:  [id number]       Chlamydia trachomatis, Neisseria gonorrhoeae, PCR - Swab, Urine, Clean Catch [[id number]]  (Normal) Collected: 07/31/24 1722    Specimen: Swab from Urine, Clean Catch Updated: 07/31/24 1911     Chlamydia DNA by PCR Not Detected     Neisseria gonorrhoeae by PCR Not Detected    hCG, Quantitative, Pregnancy [[id number]] Collected: 07/31/24 1649    Specimen: Blood Updated: 07/31/24 1745     HCG Quantitative 11,888.00 mIU/mL     Narrative:      HCG Ranges by Gestational Age    Females - non-pregnant premenopausal   </= 1mIU/mL HCG  Females - postmenopausal               </= 7mIU/mL HCG    3 Weeks       5.4   -      72 mIU/mL  4 Weeks      10.2   -     708 mIU/mL  5 Weeks       217   -   8,245 mIU/mL  6 Weeks       152   -  32,177 mIU/mL  7 Weeks     4,059   - 153,767 mIU/mL  8 Weeks    31,366   - 149,094 mIU/mL  9 Weeks    59,109   - 135,901 mIU/mL  10 Weeks   44,186   - 170,409 mIU/mL  12 Weeks   27,107   - 201,615 mIU/mL  14 Weeks   24,302   -  93,646 mIU/mL  15 Weeks   12,540   -  69,747 mIU/mL  16 Weeks    8,904   -  55,332 mIU/mL  17 Weeks    8,240   -  51,793 mIU/mL  18 Weeks    9,649   -  55,271 mIU/mL      Wet Prep, Genital - Swab, Vagina [[id number]]  (Abnormal) Collected: 07/31/24 1727    Specimen: Swab from Vagina Updated: 07/31/24 1738     YEAST No yeast seen     HYPHAL ELEMENTS No Hyphal elements seen     WBC'S 1+ WBC's seen     Clue Cells, Wet Prep No Clue cells seen     Trichomonas, Wet Prep No Trichomonas seen    Urinalysis, Microscopic Only - Urine, Clean Catch  [[id number]]  (Abnormal) Collected: 07/31/24 1721    Specimen: Urine, Clean Catch Updated: 07/31/24 1738     RBC, UA 0-2 /HPF      WBC, UA 3-5 /HPF      Bacteria, UA None Seen /HPF      Squamous Epithelial Cells, UA 0-2 /HPF      Hyaline Casts, UA None Seen /LPF      Methodology Automated Microscopy    Urinalysis With Microscopic If Indicated (No Culture) - Urine, Clean Catch [[id number]]  (Abnormal) Collected: 07/31/24 1721    Specimen: Urine, Clean Catch Updated: 07/31/24 1733     Color, UA Yellow     Appearance, UA Slightly Cloudy     Comment: Result checked          pH, UA 8.0     Specific Gravity, UA 1.023     Glucose, UA Negative     Ketones, UA Negative     Bilirubin, UA Negative     Blood, UA Negative     Protein, UA 30 mg/dL (1+)     Leuk Esterase, UA Negative     Nitrite, UA Negative     Urobilinogen, UA 1.0 E.U./dL    Comprehensive Metabolic Panel [[id number]]  (Abnormal) Collected: 07/31/24 1649    Specimen: Blood Updated: 07/31/24 1716     Glucose 82 mg/dL      BUN 7 mg/dL      Creatinine 0.50 mg/dL      Sodium 136 mmol/L      Potassium 3.8 mmol/L      Chloride 105 mmol/L      CO2 21.7 mmol/L      Calcium 8.8 mg/dL      Total Protein 6.3 g/dL      Albumin 3.9 g/dL      ALT (SGPT) 6 U/L      AST (SGOT) 14 U/L      Alkaline Phosphatase 51 U/L      Total Bilirubin 0.2 mg/dL      Globulin 2.4 gm/dL      A/G Ratio 1.6 g/dL      BUN/Creatinine Ratio 14.0     Anion Gap 9.3 mmol/L      eGFR 135.3 mL/min/1.73     Narrative:      GFR Normal >60  Chronic Kidney Disease <60  Kidney Failure <15      Lipase [[id number]]  (Normal) Collected: 07/31/24 1649    Specimen: Blood Updated: 07/31/24 1716     Lipase 23 U/L     CBC & Differential [[id number]]  (Abnormal) Collected: 07/31/24 1649    Specimen: Blood Updated: 07/31/24 1654    Narrative:      The following orders were created for panel order CBC & Differential.  Procedure                               Abnormality         Status                     ---------                                -----------         ------                     CBC Auto Differential[[id number]]        Abnormal            Final result                 Please view results for these tests on the individual orders.    CBC Auto Differential [[id number]]  (Abnormal) Collected: 07/31/24 1649    Specimen: Blood Updated: 07/31/24 1654     WBC 9.40 10*3/mm3      RBC 3.55 10*6/mm3      Hemoglobin 10.3 g/dL      Hematocrit 30.8 %      MCV 86.8 fL      MCH 29.0 pg      MCHC 33.4 g/dL      RDW 14.7 %      RDW-SD 47.1 fl      MPV 10.0 fL      Platelets 213 10*3/mm3      Neutrophil % 72.6 %      Lymphocyte % 21.5 %      Monocyte % 4.7 %      Eosinophil % 0.6 %      Basophil % 0.2 %      Immature Grans % 0.4 %      Neutrophils, Absolute 6.82 10*3/mm3      Lymphocytes, Absolute 2.02 10*3/mm3      Monocytes, Absolute 0.44 10*3/mm3      Eosinophils, Absolute 0.06 10*3/mm3      Basophils, Absolute 0.02 10*3/mm3      Immature Grans, Absolute 0.04 10*3/mm3      nRBC 0.0 /100 WBC             Radiology Results:  Imaging Results (Last 72 Hours)       Procedure Component Value Units Date/Time    MRI Abdomen Without Contrast [[id number]] Collected: 07/31/24 2151     Updated: 07/31/24 2215    Narrative:      MRI ABDOMEN WO CONTRAST    Date of Exam: 7/31/2024 8:42 PM EDT    Indication: abdominal pain in pregnancy.     Comparison: None available.    Technique:  Routine multiplanar/multisequence images of the abdomen were obtained without contrast administration.      Findings:   Study is severely degraded by motion artifact. Within technical limitation:    Included lungs: No significant abnormality.    Liver: No significant abnormality.     Gallbladder and biliary tree: The gallbladder appears unremarkable. No convincing intrahepatic or extrahepatic biliary ductal dilatation.     Spleen: No significant abnormality.     Pancreas: No significant abnormality. No convincing pancreatic ductal dilatation.     Adrenal glands: No significant  abnormality.     Kidneys and included ureters: No significant abnormality.    Stomach and duodenum: No significant abnormality.     Small and large bowel: Imaged portions appear grossly unremarkable. Appendix not definitely visualized, though it is likely not within field of view.    Peritoneal cavity: No free fluid or free air.    Vasculature: No significant abnormality.     Lymph nodes: No pathologic appearing lymph nodes by imaging criteria.     Bones and soft tissues: No significant abnormality.    Additional findings: Partially imaged gestational sac.         Impression:      Impression:    Study is severely degraded by motion artifact. Within technical limitation:    No convincing acute intra-abdominal abnormality. No convincing biliary or pancreatic ductal dilatation.    Electronically Signed: Jacob Davis    7/31/2024 10:13 PM EDT    Workstation ID: [id number]    US Ob Transvaginal [[id number]] Collected: 07/2001     Updated: 07/31/24 2004    Narrative:      US OB TRANSVAGINAL    Date of Exam: 7/31/2024 6:49 PM EDT    Indication: Spotting.    Comparison: Pelvic ultrasound 5/31/2024.    Technique: Transvaginal ultrasound was performed to evaluate pregnancy.  Real time scanning was performed with multiple image documentation per protocol.        Findings:  Clinical age: 17 weeks, 3 days.    Biparietal diameter (BPD): 3.7 cm, corresponding to an average ultrasound age of 17 weeks, 2 days.  Head circumference (HC): 14.3 cm, corresponding to an average ultrasound age of 17 weeks, 4 days.  Abdominal circumference (AC): 11.3 cm, corresponding to an average ultrasound age of 17 weeks, 1 day.  Femur length (FL): 2.4 cm, corresponding to an average ultrasound age of 17 weeks, 2 days  Fetal heart rate: 131 beats/min.  Placenta: Posterior fundal location. There is a 7 x 6 x 5 mm anechoic region within the placenta that may represent a placental cyst; recommend attention on follow-up imaging.  Cervix: 4.5 cm and  closed.  Thin subchorionic hemorrhage appears to have decreased compared to prior imaging; recommend attention on follow-up imaging.    Other: Ovaries not sonographically visible. No adnexal lesions. No free fluid.      Impression:      Impression:    Single live intrauterine pregnancy with biometric measurements corresponding to 17 weeks 1 days, which is concordant with clinical age.        Electronically Signed: Jacob Davis    7/31/2024 8:02 PM EDT    Workstation ID: [id number]    US Ob 14 + Weeks Single or First Gestation [[id number]] Collected: 07/31/24 1924     Updated: 07/31/24 1939    Narrative:      US OB 14 + WEEKS SINGLE OR FIRST GESTATION    Date of Exam: 7/31/2024 6:23 PM EDT    Indication: pelvic pain bleeding.    Comparison: Pelvic ultrasound 5/31/2024.    Technique: Grayscale and color Doppler transabdominal ultrasound was performed for maternal and fetal evaluation after the first trimester single gestation. Images were obtained per protocol.      Findings:  Clinical age: 17 weeks, 3 days.    Biparietal diameter (BPD): 3.7 cm, corresponding to an average ultrasound age of 17 weeks, 2 days.  Head circumference (HC): 14.3 cm, corresponding to an average ultrasound age of 17 weeks, 4 days.  Abdominal circumference (AC): 11.3 cm, corresponding to an average ultrasound age of 17 weeks, 1 day.  Femur length (FL): 2.4 cm, corresponding to an average ultrasound age of 17 weeks, 2 days  Fetal heart rate: 131 beats/min.  Placenta: Posterior fundal location. There is a 7 x 6 x 5 mm anechoic region within the placenta that may represent a placental cyst; recommend attention on follow-up imaging.  Cervix: 4.5 cm and closed.  Thin subchorionic hemorrhage appears to have decreased compared to prior imaging; recommend attention on follow-up imaging.    Other: Ovaries not sonographically visible. No adnexal lesions. No free fluid.      Impression:      Impression:  Single live intrauterine pregnancy with biometric  measurements corresponding to 17 weeks 1 days, which is concordant with clinical age.           Electronically Signed: Jacob Davis    7/31/2024 7:37 PM EDT    Workstation ID: [id number]            Assessment & Plan       Abdominal pain complicating pregnancy      With benign abdominal exam, nml WBC and pt wanting to eat-I am reassured with this being a benign process.  Pt voices many stressors with this pregnancy and is tearful.  Medicine team refused to admit this pt.  Will admit for 23 hour OBS.  Repeat exam and labs in am and likely d/c home.  Will treat pain w/ flexeril as I suspect back pain is MSK.  She also has concerns about her near syncope and dizziness and wants this evaluated.  I reassure her that those sx are very common in pregnancy and that she should discuss w/ her Ob for evaluation in outpt setting.   I advise her to f/u with her OB provider within 1 week after discharge home in am.     I discussed the patients findings and my recommendations with patient    Elma Borrero MD  08/01/24  00:23 EDT

## 2024-09-20 ENCOUNTER — HOSPITAL ENCOUNTER (OUTPATIENT)
Facility: HOSPITAL | Age: 24
Discharge: HOME | End: 2024-09-20
Attending: STUDENT IN AN ORGANIZED HEALTH CARE EDUCATION/TRAINING PROGRAM | Admitting: STUDENT IN AN ORGANIZED HEALTH CARE EDUCATION/TRAINING PROGRAM
Payer: MEDICAID

## 2024-09-20 VITALS
WEIGHT: 139.99 LBS | DIASTOLIC BLOOD PRESSURE: 62 MMHG | TEMPERATURE: 98 F | SYSTOLIC BLOOD PRESSURE: 106 MMHG | HEART RATE: 77 BPM | OXYGEN SATURATION: 100 % | BODY MASS INDEX: 21.97 KG/M2 | RESPIRATION RATE: 16 BRPM | HEIGHT: 67 IN

## 2024-09-20 PROBLEM — O36.8190 DECREASED FETAL MOVEMENT: Status: ACTIVE | Noted: 2024-09-20

## 2024-09-20 LAB
BILIRUB UR QL STRIP: NEGATIVE
C TRACH RRNA CVX QL NAA+PROBE: NOT DETECTED
CLARITY UR: CLEAR
CLUE CELLS SPEC QL WET PREP: ABNORMAL
COLOR UR: YELLOW
GLUCOSE UR STRIP-MCNC: ABNORMAL MG/DL
HGB UR QL STRIP.AUTO: NEGATIVE
HYDATID CYST SPEC WET PREP: ABNORMAL
KETONES UR QL STRIP: ABNORMAL
LEUKOCYTE ESTERASE UR QL STRIP.AUTO: NEGATIVE
N GONORRHOEA RRNA SPEC QL NAA+PROBE: NOT DETECTED
NITRITE UR QL STRIP: NEGATIVE
PH UR STRIP.AUTO: 6 [PH] (ref 5–8)
PROT UR QL STRIP: NEGATIVE
SP GR UR STRIP: 1.02 (ref 1–1.03)
T VAGINALIS SPEC QL WET PREP: ABNORMAL
UROBILINOGEN UR QL STRIP: ABNORMAL
WBC SPEC QL WET PREP: ABNORMAL
YEAST GENITAL QL WET PREP: ABNORMAL

## 2024-09-20 PROCEDURE — 96360 HYDRATION IV INFUSION INIT: CPT

## 2024-09-20 PROCEDURE — 81003 URINALYSIS AUTO W/O SCOPE: CPT | Performed by: STUDENT IN AN ORGANIZED HEALTH CARE EDUCATION/TRAINING PROGRAM

## 2024-09-20 PROCEDURE — 25810000003 LACTATED RINGERS SOLUTION: Performed by: STUDENT IN AN ORGANIZED HEALTH CARE EDUCATION/TRAINING PROGRAM

## 2024-09-20 PROCEDURE — 87491 CHLMYD TRACH DNA AMP PROBE: CPT | Performed by: STUDENT IN AN ORGANIZED HEALTH CARE EDUCATION/TRAINING PROGRAM

## 2024-09-20 PROCEDURE — G0463 HOSPITAL OUTPT CLINIC VISIT: HCPCS

## 2024-09-20 PROCEDURE — 87210 SMEAR WET MOUNT SALINE/INK: CPT | Performed by: STUDENT IN AN ORGANIZED HEALTH CARE EDUCATION/TRAINING PROGRAM

## 2024-09-20 PROCEDURE — 87591 N.GONORRHOEAE DNA AMP PROB: CPT | Performed by: STUDENT IN AN ORGANIZED HEALTH CARE EDUCATION/TRAINING PROGRAM

## 2024-09-20 PROCEDURE — 59025 FETAL NON-STRESS TEST: CPT

## 2024-09-20 RX ORDER — PRENATAL VIT NO.126/IRON/FOLIC 28MG-0.8MG
1 TABLET ORAL DAILY
COMMUNITY

## 2024-09-20 RX ORDER — CYCLOBENZAPRINE HCL 10 MG
5 TABLET ORAL ONCE AS NEEDED
Status: COMPLETED | OUTPATIENT
Start: 2024-09-20 | End: 2024-09-20

## 2024-09-20 RX ORDER — ALBUTEROL SULFATE 90 UG/1
2 INHALANT RESPIRATORY (INHALATION) EVERY 4 HOURS PRN
COMMUNITY

## 2024-09-20 RX ADMIN — CYCLOBENZAPRINE 5 MG: 10 TABLET, FILM COATED ORAL at 13:30

## 2024-09-20 RX ADMIN — SODIUM CHLORIDE, POTASSIUM CHLORIDE, SODIUM LACTATE AND CALCIUM CHLORIDE 1000 ML: 600; 310; 30; 20 INJECTION, SOLUTION INTRAVENOUS at 15:04

## 2024-10-20 ENCOUNTER — HOSPITAL ENCOUNTER (EMERGENCY)
Facility: HOSPITAL | Age: 24
Discharge: PSYCHIATRIC OR SUBSTANCE USE FACILITY | End: 2024-10-20
Attending: EMERGENCY MEDICINE | Admitting: EMERGENCY MEDICINE
Payer: MEDICAID

## 2024-10-20 VITALS
SYSTOLIC BLOOD PRESSURE: 101 MMHG | TEMPERATURE: 98.5 F | RESPIRATION RATE: 22 BRPM | DIASTOLIC BLOOD PRESSURE: 66 MMHG | HEART RATE: 84 BPM | OXYGEN SATURATION: 99 % | BODY MASS INDEX: 22.76 KG/M2 | HEIGHT: 67 IN | WEIGHT: 145 LBS

## 2024-10-20 DIAGNOSIS — R45.851 SUICIDAL IDEATION: Primary | ICD-10-CM

## 2024-10-20 LAB
ALBUMIN SERPL-MCNC: 3.9 G/DL (ref 3.5–5.2)
ALBUMIN/GLOB SERPL: 1.3 G/DL
ALP SERPL-CCNC: 73 U/L (ref 39–117)
ALT SERPL W P-5'-P-CCNC: 5 U/L (ref 1–33)
AMPHET+METHAMPHET UR QL: NEGATIVE
AMPHETAMINES UR QL: NEGATIVE
ANION GAP SERPL CALCULATED.3IONS-SCNC: 14 MMOL/L (ref 5–15)
APAP SERPL-MCNC: <5 MCG/ML (ref 0–30)
AST SERPL-CCNC: 16 U/L (ref 1–32)
BARBITURATES UR QL SCN: NEGATIVE
BASOPHILS # BLD AUTO: 0.01 10*3/MM3 (ref 0–0.2)
BASOPHILS NFR BLD AUTO: 0.1 % (ref 0–1.5)
BENZODIAZ UR QL SCN: NEGATIVE
BILIRUB SERPL-MCNC: 0.3 MG/DL (ref 0–1.2)
BUN SERPL-MCNC: 7 MG/DL (ref 6–20)
BUN/CREAT SERPL: 17.1 (ref 7–25)
BUPRENORPHINE SERPL-MCNC: NEGATIVE NG/ML
CALCIUM SPEC-SCNC: 9 MG/DL (ref 8.6–10.5)
CANNABINOIDS SERPL QL: POSITIVE
CHLORIDE SERPL-SCNC: 104 MMOL/L (ref 98–107)
CO2 SERPL-SCNC: 18 MMOL/L (ref 22–29)
COCAINE UR QL: NEGATIVE
CREAT SERPL-MCNC: 0.41 MG/DL (ref 0.57–1)
DEPRECATED RDW RBC AUTO: 41.1 FL (ref 37–54)
EGFRCR SERPLBLD CKD-EPI 2021: 142 ML/MIN/1.73
EOSINOPHIL # BLD AUTO: 0.02 10*3/MM3 (ref 0–0.4)
EOSINOPHIL NFR BLD AUTO: 0.2 % (ref 0.3–6.2)
ERYTHROCYTE [DISTWIDTH] IN BLOOD BY AUTOMATED COUNT: 13.1 % (ref 12.3–15.4)
ETHANOL UR QL: <0.01 %
GLOBULIN UR ELPH-MCNC: 3 GM/DL
GLUCOSE SERPL-MCNC: 76 MG/DL (ref 65–99)
HCT VFR BLD AUTO: 31.4 % (ref 34–46.6)
HGB BLD-MCNC: 10 G/DL (ref 12–15.9)
HOLD SPECIMEN: NORMAL
IMM GRANULOCYTES # BLD AUTO: 0.05 10*3/MM3 (ref 0–0.05)
IMM GRANULOCYTES NFR BLD AUTO: 0.5 % (ref 0–0.5)
LYMPHOCYTES # BLD AUTO: 1.62 10*3/MM3 (ref 0.7–3.1)
LYMPHOCYTES NFR BLD AUTO: 15 % (ref 19.6–45.3)
MCH RBC QN AUTO: 27.5 PG (ref 26.6–33)
MCHC RBC AUTO-ENTMCNC: 31.8 G/DL (ref 31.5–35.7)
MCV RBC AUTO: 86.5 FL (ref 79–97)
METHADONE UR QL SCN: NEGATIVE
MONOCYTES # BLD AUTO: 0.42 10*3/MM3 (ref 0.1–0.9)
MONOCYTES NFR BLD AUTO: 3.9 % (ref 5–12)
NEUTROPHILS NFR BLD AUTO: 8.69 10*3/MM3 (ref 1.7–7)
NEUTROPHILS NFR BLD AUTO: 80.3 % (ref 42.7–76)
NRBC BLD AUTO-RTO: 0 /100 WBC (ref 0–0.2)
OPIATES UR QL: NEGATIVE
OXYCODONE UR QL SCN: NEGATIVE
PCP UR QL SCN: NEGATIVE
PLATELET # BLD AUTO: 243 10*3/MM3 (ref 140–450)
PMV BLD AUTO: 10 FL (ref 6–12)
POTASSIUM SERPL-SCNC: 3.3 MMOL/L (ref 3.5–5.2)
PROT SERPL-MCNC: 6.9 G/DL (ref 6–8.5)
RBC # BLD AUTO: 3.63 10*6/MM3 (ref 3.77–5.28)
SALICYLATES SERPL-MCNC: <0.5 MG/DL
SODIUM SERPL-SCNC: 136 MMOL/L (ref 136–145)
T4 FREE SERPL-MCNC: 0.85 NG/DL (ref 0.93–1.7)
TRICYCLICS UR QL SCN: NEGATIVE
TSH SERPL DL<=0.05 MIU/L-ACNC: 2.37 UIU/ML (ref 0.27–4.2)
WBC NRBC COR # BLD AUTO: 10.81 10*3/MM3 (ref 3.4–10.8)

## 2024-10-20 PROCEDURE — 80050 GENERAL HEALTH PANEL: CPT | Performed by: EMERGENCY MEDICINE

## 2024-10-20 PROCEDURE — 99285 EMERGENCY DEPT VISIT HI MDM: CPT

## 2024-10-20 PROCEDURE — 84439 ASSAY OF FREE THYROXINE: CPT | Performed by: EMERGENCY MEDICINE

## 2024-10-20 PROCEDURE — 82077 ASSAY SPEC XCP UR&BREATH IA: CPT | Performed by: EMERGENCY MEDICINE

## 2024-10-20 PROCEDURE — 80306 DRUG TEST PRSMV INSTRMNT: CPT | Performed by: EMERGENCY MEDICINE

## 2024-10-20 PROCEDURE — 80143 DRUG ASSAY ACETAMINOPHEN: CPT | Performed by: EMERGENCY MEDICINE

## 2024-10-20 PROCEDURE — 80179 DRUG ASSAY SALICYLATE: CPT | Performed by: EMERGENCY MEDICINE

## 2024-10-20 RX ORDER — MIDAZOLAM HYDROCHLORIDE 1 MG/ML
5 INJECTION INTRAMUSCULAR; INTRAVENOUS ONCE AS NEEDED
Status: DISCONTINUED | OUTPATIENT
Start: 2024-10-20 | End: 2024-10-20

## 2024-10-20 RX ORDER — MIDAZOLAM HYDROCHLORIDE 5 MG/ML
5 INJECTION INTRAMUSCULAR; INTRAVENOUS ONCE AS NEEDED
Status: DISCONTINUED | OUTPATIENT
Start: 2024-10-20 | End: 2024-10-20

## 2024-10-20 RX ORDER — OLANZAPINE 5 MG/1
10 TABLET, ORALLY DISINTEGRATING ORAL ONCE
Status: DISCONTINUED | OUTPATIENT
Start: 2024-10-20 | End: 2024-10-21 | Stop reason: HOSPADM

## 2024-10-20 RX ORDER — DIAZEPAM 5 MG
10 TABLET ORAL ONCE AS NEEDED
Status: COMPLETED | OUTPATIENT
Start: 2024-10-20 | End: 2024-10-20

## 2024-10-20 RX ORDER — HALOPERIDOL 5 MG/ML
5 INJECTION INTRAMUSCULAR ONCE AS NEEDED
Status: DISCONTINUED | OUTPATIENT
Start: 2024-10-20 | End: 2024-10-21 | Stop reason: HOSPADM

## 2024-10-20 RX ADMIN — DIAZEPAM 10 MG: 5 TABLET ORAL at 18:07

## 2024-10-20 NOTE — ED PROVIDER NOTES
"Subjective   History of Present Illness  Patient reportedly here for SI but refusing to speak to me.  Patient very agitated, yelling in room.  He is willing to speak to other staff.  Patient reportedly with worsening SI.  Has had 3 attempts in the past.  Has 2 children.  Father of older child has both children apparently.  Patient denying any pain.  Review of Systems  See HPI.  Past Medical History:   Diagnosis Date    Asthma     Depression     Eating disorder     Headache     Miscarriage 08/19/2019    X2       Allergies   Allergen Reactions    Latex Rash    Buspar [Buspirone] Irritability       Past Surgical History:   Procedure Laterality Date    DENTAL PROCEDURE         Family History   Problem Relation Age of Onset    Diabetes Maternal Grandfather        Social History     Socioeconomic History    Marital status:      Spouse name: Mark Manley    Number of children: 2   Tobacco Use    Smoking status: Former     Types: Electronic Cigarette     Quit date: 2020     Years since quittin.3    Smokeless tobacco: Never    Tobacco comments:      vaped in the past but no longer   Vaping Use    Vaping status: Former   Substance and Sexual Activity    Alcohol use: No    Drug use: No    Sexual activity: Yes     Partners: Male     Birth control/protection: None           Objective   Physical Exam  No acute distress, regular rate and rhythm, no tachypnea or increased work of breathing, gravid uterus consistent with dates, NCAT, alert, moving all extremities.  Procedures           ED Course      /66   Pulse 84   Temp 98.5 °F (36.9 °C) (Oral)   Resp 22   Ht 170.2 cm (67\")   Wt 65.8 kg (145 lb)   LMP 2024 (Exact Date)   SpO2 99%   BMI 22.71 kg/m²   Labs Reviewed   COMPREHENSIVE METABOLIC PANEL - Abnormal; Notable for the following components:       Result Value    Creatinine 0.41 (*)     Potassium 3.3 (*)     CO2 18.0 (*)     All other components within normal limits    Narrative:     GFR Normal " >60  Chronic Kidney Disease <60  Kidney Failure <15     URINE DRUG SCREEN - Abnormal; Notable for the following components:    THC, Screen, Urine Positive (*)     All other components within normal limits    Narrative:     Cutoff For Drugs Screened:    Amphetamines               500 ng/ml  Barbiturates               200 ng/ml  Benzodiazepines            150 ng/ml  Cocaine                    150 ng/ml  Methadone                  200 ng/ml  Opiates                    100 ng/ml  Phencyclidine               25 ng/ml  THC                         50 ng/ml  Methamphetamine            500 ng/ml  Tricyclic Antidepressants  300 ng/ml  Oxycodone                  100 ng/ml  Buprenorphine               10 ng/ml    The normal value for all drugs tested is negative. This report includes unconfirmed screening results, with the cutoff values listed, to be used for medical treatment purposes only.  Unconfirmed results must not be used for non-medical purposes such as employment or legal testing.  Clinical consideration should be applied to any drug of abuse test, particularly when unconfirmed results are used.    All urine drugs of abuse requests without chain of custody are for medical screening purposes only.  False positives are possible.     T4, FREE - Abnormal; Notable for the following components:    Free T4 0.85 (*)     All other components within normal limits   CBC WITH AUTO DIFFERENTIAL - Abnormal; Notable for the following components:    WBC 10.81 (*)     RBC 3.63 (*)     Hemoglobin 10.0 (*)     Hematocrit 31.4 (*)     Neutrophil % 80.3 (*)     Lymphocyte % 15.0 (*)     Monocyte % 3.9 (*)     Eosinophil % 0.2 (*)     Neutrophils, Absolute 8.69 (*)     All other components within normal limits   ACETAMINOPHEN LEVEL - Normal    Narrative:     Acetaminophen Therapeutic Range  5-20 ug/mL      Hours after ingestion            Toxic Value    4 Hours                           150 ug/mL    8 Hours                            70  ug/mL   12 Hours                            40 ug/mL   16 Hours                            20 ug/mL    These values apply to a single ingestion only.    SALICYLATE LEVEL - Normal   TSH - Normal   ETHANOL    Narrative:     Plasma Ethanol Clinical Symptoms:    ETOH (%)               Clinical Symptom  .01-.05              No apparent influence  .03-.12              Euphoria, Diminished judgment and attention   .09-.25              Impaired comprehension, Muscle incoordination  .18-.30              Confusion, Staggered gait, Slurred speech  .25-.40              Markedly decreased response to stimuli, unable to stand or                        walk, vomitting, sleep or stupor  .35-.50              Comatose, Anesthesia, Subnormal body temperature       CBC AND DIFFERENTIAL    Narrative:     The following orders were created for panel order CBC & Differential.  Procedure                               Abnormality         Status                     ---------                               -----------         ------                     CBC Auto Differential[[id number]]        Abnormal            Final result                 Please view results for these tests on the individual orders.   EXTRA TUBES    Narrative:     The following orders were created for panel order Extra Tubes.  Procedure                               Abnormality         Status                     ---------                               -----------         ------                     Gold Top - SST[[id number]]                                   Final result                 Please view results for these tests on the individual orders.   GOLD TOP - SST     Scheduled Meds:OLANZapine zydis, 10 mg, Oral, Once      Continuous Infusions:   PRN Meds:.  haloperidol lactate                                           Medical Decision Making    Patient will need inpatient psychiatry.  Evaluated by [facility].  [facility] contacted.  Labs largely unremarkable.  Patient  refusing NST.    Patient accepted to [facility].  [facility] sent.  Final diagnoses:   Suicidal ideation       ED Disposition  ED Disposition       ED Disposition   DC/Transfer to Behavioral Health    Condition   Stable    Comment   --               No follow-up provider specified.       Medication List      No changes were made to your prescriptions during this visit.            Chinmay Garcia MD  10/21/24 0011
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
## 2024-10-20 NOTE — ED NOTES
Maurilio at bedside speaking with patient at this time. Pt in no apparent distress, security at bedside monitoring pt 1:1 for safety, suicide precautions remain in place.

## 2024-10-20 NOTE — ED NOTES
Pt in green gown, security monitoring pt 1:1 for safety, suicide precautions in place. As many ligature and safety hazards as possible removed from room.
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
## 2024-10-21 NOTE — CASE MANAGEMENT/SOCIAL WORK
Case Management/Social Work    Patient Name:  Mouna Manley  YOB: 2000  MRN: [id number]  Admit Date:  10/20/2024        CM received a VM from an unnamed caller ([phone]) asking for CM to call back in regard to patient's 72 hr hold. Caller stated she has information that would help navigate what is best for patient. CM reviewed patient's chart, this number is not on the patient's chart. Patient is also a confidential encounter and CM is unable to call unnamed caller back due to the confidential encounter, and is unable to call the unnamed caller back due to the vague VM. CM reviewed SW and nursing charting. Patient discharged to IP facility.     RORO Chavis, RN, [facility]    [address] [address]  Phone: [phone]  Fax: [phone]

## 2024-10-21 NOTE — NURSING NOTE
Called by ER to do NST on baby. RN went to bedside and pt refused to have baby monitored. RN asked pt twice if she wanted RN to check on baby and get heart tones and pt refused.